# Patient Record
Sex: FEMALE | Race: BLACK OR AFRICAN AMERICAN | NOT HISPANIC OR LATINO | Employment: FULL TIME | URBAN - METROPOLITAN AREA
[De-identification: names, ages, dates, MRNs, and addresses within clinical notes are randomized per-mention and may not be internally consistent; named-entity substitution may affect disease eponyms.]

---

## 2017-01-11 ENCOUNTER — TELEPHONE (OUTPATIENT)
Dept: OBSTETRICS AND GYNECOLOGY | Facility: CLINIC | Age: 52
End: 2017-01-11

## 2017-01-11 ENCOUNTER — OFFICE VISIT (OUTPATIENT)
Dept: OBSTETRICS AND GYNECOLOGY | Facility: CLINIC | Age: 52
End: 2017-01-11
Attending: OBSTETRICS & GYNECOLOGY
Payer: COMMERCIAL

## 2017-01-11 VITALS
HEIGHT: 63 IN | SYSTOLIC BLOOD PRESSURE: 112 MMHG | WEIGHT: 211.44 LBS | DIASTOLIC BLOOD PRESSURE: 74 MMHG | BODY MASS INDEX: 37.46 KG/M2

## 2017-01-11 DIAGNOSIS — R10.31 RIGHT LOWER QUADRANT PAIN: Primary | ICD-10-CM

## 2017-01-11 PROCEDURE — 99999 PR PBB SHADOW E&M-EST. PATIENT-LVL III: CPT | Mod: PBBFAC,,, | Performed by: OBSTETRICS & GYNECOLOGY

## 2017-01-11 PROCEDURE — 99213 OFFICE O/P EST LOW 20 MIN: CPT | Mod: S$GLB,,, | Performed by: OBSTETRICS & GYNECOLOGY

## 2017-01-11 PROCEDURE — 1159F MED LIST DOCD IN RCRD: CPT | Mod: S$GLB,,, | Performed by: OBSTETRICS & GYNECOLOGY

## 2017-01-11 RX ORDER — METRONIDAZOLE 500 MG/1
TABLET ORAL
COMMUNITY
Start: 2017-01-09 | End: 2017-09-27

## 2017-01-11 NOTE — TELEPHONE ENCOUNTER
----- Message from Lisa Cheng sent at 1/11/2017  8:18 AM CST -----  Contact: self  Pt wanting to be seen today, your next available appt was 02/27/17. I scheduled the pt with Dr Patricio for 1:15 today, pt can be reached at [phone].

## 2017-01-11 NOTE — MR AVS SNAPSHOT
[facility] [address]  [address] [facility] [facility] [address] [facility]  [address]  [facility] [address]  Phone: [phone]  Fax: [phone]                  April Krys   2017 1:15 PM   Office Visit    Description:  Female : 1965   Provider:  Ginger Patricio MD   Department:  [facility] [address]           Reason for Visit     Pelvic Pain                To Do List           Goals (5 Years of Data)     None      [facility] On Call     [facility] On Call Nurse [facility] -  Assistance  Registered nurses in the [facility] On Call Center provide clinical advisement, health education, appointment booking, and other advisory services.  Call for this free service at [phone].             Medications           Message regarding Medications     Verify the changes and/or additions to your medication regime listed below are the same as discussed with your clinician today.  If any of these changes or additions are incorrect, please notify your healthcare provider.             Verify that the below list of medications is an accurate representation of the medications you are currently taking.  If none reported, the list may be blank. If incorrect, please contact your healthcare provider. Carry this list with you in case of emergency.           Current Medications     albuterol (ACCUNEB) 0.63 mg/3 mL Nebu Take 0.63 mg by nebulization every 6 (six) hours as needed.    cetirizine (ZYRTEC) 10 MG tablet Take 10 mg by mouth once daily.    ciprofloxacin (CIPRO) 500 MG tablet Take 500 mg by mouth 2 (two) times daily.    clindamycin (CLEOCIN) 300 MG capsule Take 300 mg by mouth 3 (three) times daily.    fluticasone (FLONASE) 50 mcg/actuation nasal spray     hydrocodone-acetaminophen 7.5-300 mg Tab Take 1 tablet by mouth every 4 to 6 hours as needed.    metronidazole (FLAGYL) 500 MG tablet     mometasone (NASONEX) 50 mcg/actuation nasal spray 2 sprays by Nasal route once daily.    tramadol (ULTRAM) 50 mg tablet Take 50 mg by mouth  "every 6 (six) hours as needed.    amoxicillin (AMOXIL) 875 MG tablet Take 875 mg by mouth 3 (three) times daily.           Clinical Reference Information           Vital Signs - Last Recorded  Most recent update: 1/11/2017  1:35 PM by Gamal Araiza LPN    BP Ht Wt LMP BMI    112/74 5' 3" (1.6 m) 95.9 kg (211 lb 6.7 oz) (LMP Unknown) 37.45 kg/m2      Blood Pressure          Most Recent Value    BP  112/74      Allergies as of 1/11/2017     Codeine      Immunizations Administered on Date of Encounter - 1/11/2017     None      "

## 2017-01-11 NOTE — PROGRESS NOTES
HISTORY OF PRESENT ILLNESS:    April Krys is a 51 y.o. female, , No LMP recorded (lmp unknown). Patient has had a hysterectomy.,  presents for a problem visit, complaining of right lower abdominal and back pain for the last week.  She had diarrhea and low grade temp a few days ago;  She saw GI at [facility] - exam normal.    CT scan is scheduled at [facility].  He started her on antibiotics and pain is slightly better but persistent.    Patient has had TLH and RSO.       Past Medical History   Diagnosis Date    Abdominal pain, right lower quadrant     Asthma     PONV (postoperative nausea and vomiting)        Past Surgical History   Procedure Laterality Date    Oophorectomy       AP---RSO    Cholecystectomy      Hysterectomy       KJB---TLH, FIBROIDS, BLEEDING       MEDICATIONS AND ALLERGIES:      Current Outpatient Prescriptions:     albuterol (ACCUNEB) 0.63 mg/3 mL Nebu, Take 0.63 mg by nebulization every 6 (six) hours as needed., Disp: , Rfl:     cetirizine (ZYRTEC) 10 MG tablet, Take 10 mg by mouth once daily., Disp: , Rfl:     ciprofloxacin (CIPRO) 500 MG tablet, Take 500 mg by mouth 2 (two) times daily., Disp: , Rfl: 0    clindamycin (CLEOCIN) 300 MG capsule, Take 300 mg by mouth 3 (three) times daily., Disp: , Rfl: 0    fluticasone (FLONASE) 50 mcg/actuation nasal spray, , Disp: , Rfl: 5    hydrocodone-acetaminophen 7.5-300 mg Tab, Take 1 tablet by mouth every 4 to 6 hours as needed., Disp: , Rfl: 0    metronidazole (FLAGYL) 500 MG tablet, , Disp: , Rfl:     mometasone (NASONEX) 50 mcg/actuation nasal spray, 2 sprays by Nasal route once daily., Disp: , Rfl:     tramadol (ULTRAM) 50 mg tablet, Take 50 mg by mouth every 6 (six) hours as needed., Disp: , Rfl: 0    amoxicillin (AMOXIL) 875 MG tablet, Take 875 mg by mouth 3 (three) times daily., Disp: , Rfl: 0    Review of patient's allergies indicates:   Allergen Reactions    Codeine Itching       Family History   Problem Relation  "Age of Onset    Breast cancer Mother     Colon cancer Neg Hx     Ovarian cancer Neg Hx        Social History     Social History    Marital status:      Spouse name: N/A    Number of children: N/A    Years of education: N/A     Occupational History    Not on file.     Social History Main Topics    Smoking status: Never Smoker    Smokeless tobacco: Not on file    Alcohol use Yes      Comment: occasionally    Drug use: No    Sexual activity: Yes     Partners: Male     Other Topics Concern    Not on file     Social History Narrative       COMPREHENSIVE GYN HISTORY:  PAP History: Denies abnormal Paps.  Infection History: Denies STDs. Denies PID.  Benign History: Denies uterine fibroids. Denies ovarian cysts. Denies endometriosis. Denies other conditions.  Cancer History: Denies cervical cancer. Denies uterine cancer or hyperplasia. Denies ovarian cancer. Denies vulvar cancer or pre-cancer. Denies vaginal cancer or pre-cancer. Denies breast cancer. Denies colon cancer.    ROS:  GENERAL: No weight changes. No swelling. No fatigue. No fever.  CARDIOVASCULAR: No chest pain. No shortness of breath. No leg cramps.   NEUROLOGICAL: No headaches. No vision changes.  BREASTS: No pain. No lumps. No discharge.  ABDOMEN: see HPI.  REPRODUCTIVE: No abnormal bleeding.   VULVA: No pain. No lesions. No itching.  VAGINA: No relaxation. No itching. No odor. No discharge. No lesions.  URINARY: No incontinence. No nocturia. No frequency. No dysuria.    Visit Vitals    /74    Ht 5' 3" (1.6 m)    Wt 95.9 kg (211 lb 6.7 oz)    LMP  (LMP Unknown)    BMI 37.45 kg/m2       PE:  APPEARANCE: Well nourished, well developed, in no acute distress.  ABDOMEN: Soft. No tenderness or masses. No hepatosplenomegaly. No hernias.  BREASTS, FUNDOSCOPIC, RECTAL DEFERRED  PELVIC: External female genitalia without lesions.  Female hair distribution. Adequate perineal body, Normal urethral meatus. Vagina moist and well rugated without " lesions or discharge.  No significant cystocele or rectocele present. Cervix pink without lesions, discharge or tenderness. Uterus is absent. Adnexa not palpable but no masses or tenderness on exam.  EXTREMITIES: No edema      DIAGNOSIS:  1. Right lower quadrant pain  US Pelvis Complete Non OB       COUNSELING:  Patient with hx RSO;  Discussed unlikely pain due to gyn source.  Follow-up ultrasound.  Patient to follow-up with GI and PCP.

## 2017-01-16 ENCOUNTER — HOSPITAL ENCOUNTER (OUTPATIENT)
Dept: RADIOLOGY | Facility: OTHER | Age: 52
Discharge: HOME | End: 2017-01-16
Attending: OBSTETRICS & GYNECOLOGY
Payer: COMMERCIAL

## 2017-01-16 DIAGNOSIS — R10.31 RIGHT LOWER QUADRANT PAIN: ICD-10-CM

## 2017-01-16 PROCEDURE — 76830 TRANSVAGINAL US NON-OB: CPT | Mod: 26,,, | Performed by: RADIOLOGY

## 2017-01-16 PROCEDURE — 76856 US EXAM PELVIC COMPLETE: CPT | Mod: 26,,, | Performed by: RADIOLOGY

## 2017-01-16 PROCEDURE — 76856 US EXAM PELVIC COMPLETE: CPT | Mod: TC

## 2017-01-17 ENCOUNTER — TELEPHONE (OUTPATIENT)
Dept: OBSTETRICS AND GYNECOLOGY | Facility: CLINIC | Age: 52
End: 2017-01-17

## 2017-01-18 NOTE — TELEPHONE ENCOUNTER
----- Message from Evelyne Mark sent at 1/18/2017  8:13 AM CST -----  Contact: self  Patient is requesting test results. Patient can be reached at [phone].

## 2017-01-19 ENCOUNTER — TELEPHONE (OUTPATIENT)
Dept: OBSTETRICS AND GYNECOLOGY | Facility: CLINIC | Age: 52
End: 2017-01-19

## 2017-01-19 DIAGNOSIS — N83.202 LEFT OVARIAN CYST: Primary | ICD-10-CM

## 2017-01-19 NOTE — TELEPHONE ENCOUNTER
Discussed ultrasound results with patient in detail.  Patient states she recently started meds for IBS  All questions answered.  Patient with pain on right;  2.5 cm cyst on left ovary.  Recommended repeat ultrasound in 6-8 weeks.    Please mail patient a work excuse for Tuesday jan 17 to her house.    Please schedule repeat ultrasound in 6-8 weeks.  Ok to schedule for patient - she requests appointment after 3 pm

## 2017-09-13 ENCOUNTER — TELEPHONE (OUTPATIENT)
Dept: OBSTETRICS AND GYNECOLOGY | Facility: CLINIC | Age: 52
End: 2017-09-13

## 2017-09-13 DIAGNOSIS — Z12.39 BREAST CANCER SCREENING: Primary | ICD-10-CM

## 2017-09-13 NOTE — TELEPHONE ENCOUNTER
----- Message from Julio Morrell MA sent at 9/12/2017  4:48 PM CDT -----  Contact: Self      ----- Message -----  From: Cachorro Stock MA  Sent: 9/12/2017   4:34 PM  To: [facility] Staff    Pt is calling to have her mmg orders put in.  The pt can be reached at [phone].  Thanks FL

## 2017-09-27 ENCOUNTER — OFFICE VISIT (OUTPATIENT)
Dept: OBSTETRICS AND GYNECOLOGY | Facility: CLINIC | Age: 52
End: 2017-09-27
Payer: COMMERCIAL

## 2017-09-27 ENCOUNTER — HOSPITAL ENCOUNTER (OUTPATIENT)
Dept: RADIOLOGY | Facility: OTHER | Age: 52
Discharge: HOME | End: 2017-09-27
Attending: OBSTETRICS & GYNECOLOGY
Payer: COMMERCIAL

## 2017-09-27 ENCOUNTER — TELEPHONE (OUTPATIENT)
Dept: OBSTETRICS AND GYNECOLOGY | Facility: CLINIC | Age: 52
End: 2017-09-27

## 2017-09-27 VITALS
BODY MASS INDEX: 38.56 KG/M2 | WEIGHT: 217.63 LBS | SYSTOLIC BLOOD PRESSURE: 116 MMHG | DIASTOLIC BLOOD PRESSURE: 86 MMHG | HEIGHT: 63 IN

## 2017-09-27 DIAGNOSIS — Z12.31 VISIT FOR SCREENING MAMMOGRAM: ICD-10-CM

## 2017-09-27 DIAGNOSIS — Z01.419 ENCOUNTER FOR WELL WOMAN EXAM WITH ROUTINE GYNECOLOGICAL EXAM: Primary | ICD-10-CM

## 2017-09-27 DIAGNOSIS — Z12.39 BREAST CANCER SCREENING: ICD-10-CM

## 2017-09-27 PROCEDURE — 77067 SCR MAMMO BI INCL CAD: CPT | Mod: TC

## 2017-09-27 PROCEDURE — 77067 SCR MAMMO BI INCL CAD: CPT | Mod: 26,,, | Performed by: INTERNAL MEDICINE

## 2017-09-27 PROCEDURE — 99396 PREV VISIT EST AGE 40-64: CPT | Mod: S$GLB,,, | Performed by: ADVANCED PRACTICE MIDWIFE

## 2017-09-27 PROCEDURE — 99999 PR PBB SHADOW E&M-EST. PATIENT-LVL II: CPT | Mod: PBBFAC,,,

## 2017-09-27 PROCEDURE — 3008F BODY MASS INDEX DOCD: CPT | Mod: S$GLB,,, | Performed by: ADVANCED PRACTICE MIDWIFE

## 2017-09-27 NOTE — TELEPHONE ENCOUNTER
Called patient. Patient was inform that  do not have clinic in the afternoon. I asked patient would she like to come at an earlier time patient stated that she took off for work for this appointment. I apologize to the patient and told patient that we have a Women walk in clinic that can do her annual.Patient agreed. Patient also have mammo in the same building.

## 2017-09-27 NOTE — LETTER
September 27, 2017    Karissa Marcano  [address] [address]         [facility] [address]  [address], [facility] [address]  [place] [address]  Phone: [phone]  Fax: [phone] September 27, 2017     Patient: Karissa Marcano   YOB: 1965   Date of Visit: 9/27/2017       To Whom It May Concern:    It is my medical opinion that Karissa Marcano may return to work on 9-28-17.    If you have any questions or concerns, please don't hesitate to call.    Sincerely,        Charley Tony CNM

## 2017-09-27 NOTE — PROGRESS NOTES
HISTORY OF PRESENT ILLNESS:    April Krys is a 52 y.o. female, , No LMP recorded (lmp unknown). Patient has had a hysterectomy.,  presents for a routine annual exam . Pt has no complaints or concerns.    Past Medical History:   Diagnosis Date    Abdominal pain, right lower quadrant     Asthma     PONV (postoperative nausea and vomiting)        Past Surgical History:   Procedure Laterality Date    CHOLECYSTECTOMY      HYSTERECTOMY      KJB---TLH, FIBROIDS, BLEEDING    OOPHORECTOMY      AP---RSO       MEDICATIONS AND ALLERGIES:    No current outpatient prescriptions on file.    Review of patient's allergies indicates:   Allergen Reactions    Codeine Itching       Family History   Problem Relation Age of Onset    Breast cancer Mother     Colon cancer Neg Hx     Ovarian cancer Neg Hx        Social History     Social History    Marital status:      Spouse name: N/A    Number of children: N/A    Years of education: N/A     Occupational History    Not on file.     Social History Main Topics    Smoking status: Never Smoker    Smokeless tobacco: Never Used    Alcohol use No    Drug use: No    Sexual activity: Yes     Partners: Male     Birth control/ protection: None     Other Topics Concern    Not on file     Social History Narrative    No narrative on file       COMPREHENSIVE GYN HISTORY:  PAP History: Denies abnormal Paps.  Infection History: Denies STDs. Denies PID.  Benign History: Denies uterine fibroids. Denies ovarian cysts. Denies endometriosis. Denies other conditions.  Cancer History: Denies cervical cancer. Denies uterine cancer or hyperplasia. Denies ovarian cancer. Denies vulvar cancer or pre-cancer. Denies vaginal cancer or pre-cancer. Denies breast cancer. Denies colon cancer.  Sexual Activity History:  currently  sexually active  Menstrual History:   Dysmenorrhea History:  Contraception:    ROS:  GENERAL: No weight changes. No swelling. No fatigue. No  "fever.  CARDIOVASCULAR: No chest pain. No shortness of breath. No leg cramps.   NEUROLOGICAL: No headaches. No vision changes.  BREASTS: No pain. No lumps. No discharge.  ABDOMEN: No pain. No nausea. No vomiting. No diarrhea. No constipation.  REPRODUCTIVE: No abnormal bleeding.   VULVA: No pain. No lesions. No itching.  VAGINA: No relaxation. No itching. No odor. No discharge. No lesions.  URINARY: No incontinence. No nocturia. No frequency. No dysuria.    /86 (BP Method: Large (Manual))   Ht 5' 3" (1.6 m)   Wt 98.7 kg (217 lb 9.5 oz)   LMP  (LMP Unknown)   BMI 38.55 kg/m²     PE:  APPEARANCE: Well nourished, well developed, in no acute distress.  AFFECT: WNL, alert and oriented x 3. Interactive during exam  SKIN: No acne or hirsutism.  NECK: Neck symmetric, without masses or thyromegaly.  NODES: No inguinal, axillary or femoral lymph node enlargement.  CHEST: Good respiratory effort.   ABDOMEN: Soft. No tenderness or masses. No hepatosplenomegaly. No hernias.  BREASTS: Symmetrical, no skin changes, visible lesions, palpable masses or nipple discharge bilaterally.  PELVIC: External female genitalia without lesions.  Female hair distribution. Adequate perineal body, Normal urethral meatus. Vagina moist and well rugated without lesions or discharge.  No significant cystocele or rectocele present. Cervix and uterus surgically absent.and nontender. Adnexa without masses or tenderness.  EXTREMITIES: No edema    PROCEDURES:  None    DIAGNOSIS:  1. Gyn exam    LABS AND TESTS ORDERED: Mammogram scheduled today    MEDICATIONS PRESCRIBED:None    COUNSELING:  The patient was counseled today on:  -A.C.S. Pap and pelvic exam guidelines, recomendations for yearly mammogram, monthly self breast exams and to follow up with her PCP for other health maintenance.    FOLLOW-UP : q 2-3 years for gyn exam  "

## 2019-06-04 ENCOUNTER — TELEPHONE (OUTPATIENT)
Dept: OBSTETRICS AND GYNECOLOGY | Facility: CLINIC | Age: 54
End: 2019-06-04

## 2019-06-04 ENCOUNTER — OFFICE VISIT (OUTPATIENT)
Dept: OBSTETRICS AND GYNECOLOGY | Facility: CLINIC | Age: 54
End: 2019-06-04
Payer: COMMERCIAL

## 2019-06-04 VITALS — BODY MASS INDEX: 35.96 KG/M2 | WEIGHT: 203 LBS | DIASTOLIC BLOOD PRESSURE: 66 MMHG | SYSTOLIC BLOOD PRESSURE: 123 MMHG

## 2019-06-04 DIAGNOSIS — N76.3 CHRONIC VULVITIS: ICD-10-CM

## 2019-06-04 DIAGNOSIS — Z12.39 BREAST CANCER SCREENING: ICD-10-CM

## 2019-06-04 DIAGNOSIS — Z90.710 POST HYSTERECTOMY MENOPAUSE: ICD-10-CM

## 2019-06-04 DIAGNOSIS — E89.40 POST HYSTERECTOMY MENOPAUSE: ICD-10-CM

## 2019-06-04 DIAGNOSIS — Z01.419 WELL WOMAN EXAM WITH ROUTINE GYNECOLOGICAL EXAM: Primary | ICD-10-CM

## 2019-06-04 DIAGNOSIS — N89.8 VAGINAL DISCHARGE: ICD-10-CM

## 2019-06-04 PROCEDURE — 87510 GARDNER VAG DNA DIR PROBE: CPT

## 2019-06-04 PROCEDURE — 99999 PR PBB SHADOW E&M-EST. PATIENT-LVL III: ICD-10-PCS | Mod: PBBFAC,,, | Performed by: NURSE PRACTITIONER

## 2019-06-04 PROCEDURE — 87480 CANDIDA DNA DIR PROBE: CPT

## 2019-06-04 PROCEDURE — 99396 PREV VISIT EST AGE 40-64: CPT | Mod: S$GLB,,, | Performed by: NURSE PRACTITIONER

## 2019-06-04 PROCEDURE — 99396 PR PREVENTIVE VISIT,EST,40-64: ICD-10-PCS | Mod: S$GLB,,, | Performed by: NURSE PRACTITIONER

## 2019-06-04 PROCEDURE — 99999 PR PBB SHADOW E&M-EST. PATIENT-LVL III: CPT | Mod: PBBFAC,,, | Performed by: NURSE PRACTITIONER

## 2019-06-04 RX ORDER — TERCONAZOLE 8 MG/G
1 CREAM VAGINAL NIGHTLY
Qty: 20 G | Refills: 4 | Status: SHIPPED | OUTPATIENT
Start: 2019-06-04 | End: 2019-06-07

## 2019-06-04 RX ORDER — FLUCONAZOLE 150 MG/1
TABLET ORAL
Qty: 2 TABLET | Refills: 4 | Status: SHIPPED | OUTPATIENT
Start: 2019-06-04 | End: 2023-09-19 | Stop reason: ALTCHOICE

## 2019-06-04 NOTE — LETTER
June 4, 2019      [facility]  [address]  [place] [address]  Phone: [phone]       Patient: April April Krys   YOB: 1965  Date of Visit: 06/04/2019    To Whom It May Concern:    Jethro Marcano  was at [facility] on 06/04/2019. She may return on 6/5/19 with no  restrictions. If you have any questions or concerns, or if I can be of further assistance, please do not hesitate to contact me.    Sincerely,    Damon Romo LPN

## 2019-06-04 NOTE — PROGRESS NOTES
HISTORY OF PRESENT ILLNESS:    April Krys is a 54 y.o. female,   presents today for her routine visit and c/o persistent vulvar itching.  -Has used OTC Monistat suppositories and Vagifem without relief.   -Then tried a douche, which made the itching worse.   -Denies associated fever, pelvic or vaginal pain, odor, discharge, UTI sx, bleeding.   -She douches and takes hot baths, otherwise does not use other vulvovaginal irritants.     Past Medical History:   Diagnosis Date    Abdominal pain, right lower quadrant     Asthma     PONV (postoperative nausea and vomiting)        Past Surgical History:   Procedure Laterality Date    CHOLECYSTECTOMY      COLONOSCOPY N/A 2013    Performed by [facility] at [facility] (4TH FLR)    EGD (ESOPHAGOGASTRODUODENOSCOPY) N/A 2013    Performed by [facility] at [facility] (4TH FLR)    HYSTERECTOMY      KJB---TLH, FIBROIDS, BLEEDING    OOPHORECTOMY      AP---RSO       MEDICATIONS AND ALLERGIES:      Current Outpatient Medications:     fluconazole (DIFLUCAN) 150 MG Tab, Take one tablet by mouth once and may retreat in 3 days if still symptomatic, Disp: 2 tablet, Rfl: 4    terconazole (TERAZOL 3) 0.8 % vaginal cream, Place 1 applicator vaginally every evening. for 3 days, Disp: 20 g, Rfl: 4    Review of patient's allergies indicates:   Allergen Reactions    Codeine Itching       Family History   Problem Relation Age of Onset    Breast cancer Mother     Breast cancer Sister 40    Colon cancer Neg Hx     Ovarian cancer Neg Hx        Social History     Socioeconomic History    Marital status:      Spouse name: Not on file    Number of children: Not on file    Years of education: Not on file    Highest education level: Not on file   Occupational History    Not on file   Social Needs    Financial resource strain: Not on file    Food insecurity:     Worry: Not on file     Inability: Not on file    Transportation needs:      Medical: Not on file     Non-medical: Not on file   Tobacco Use    Smoking status: Never Smoker    Smokeless tobacco: Never Used   Substance and Sexual Activity    Alcohol use: No    Drug use: No    Sexual activity: Yes     Partners: Male     Birth control/protection: None   Lifestyle    Physical activity:     Days per week: Not on file     Minutes per session: Not on file    Stress: Not on file   Relationships    Social connections:     Talks on phone: Not on file     Gets together: Not on file     Attends Hoahaoism service: Not on file     Active member of club or organization: Not on file     Attends meetings of clubs or organizations: Not on file     Relationship status: Not on file   Other Topics Concern    Not on file   Social History Narrative    Not on file       OB HISTORY: Number of vaginal deliveries:1    COMPREHENSIVE GYN HISTORY:  PAP History: Denies abnormal Paps.  Infection History: Denies STDs. Denies PID.  Benign History: Reports uterine fibroids. Denies ovarian cysts. Denies endometriosis. Denies other conditions.  Cancer History: Denies cervical cancer. Denies uterine cancer or hyperplasia. Denies ovarian cancer. Denies vulvar cancer or pre-cancer. Denies vaginal cancer or pre-cancer. Denies breast cancer. Denies colon cancer.  Sexual Activity History: Reports currently being sexually active  Menstrual History: Denies menses. Pt is  not on ERT.     ROS:  GENERAL: + TRYING TO LOSE WEIGHT. No swelling. No fatigue. No fever.  CARDIOVASCULAR: No chest pain. No shortness of breath. No leg cramps.   NEUROLOGICAL: No headaches. No vision changes.  BREASTS: No pain. No lumps. No discharge.  ABDOMEN: No pain. No nausea. No vomiting. No diarrhea. No constipation.  REPRODUCTIVE: No abnormal bleeding.  VULVA: No pain. No lesions. + ITCHING.  VAGINA: No relaxation. No itching. No odor. No discharge. No lesions.  URINARY: No incontinence. No nocturia. No frequency. No dysuria.    /[birth year]   Wt  92.1 kg (203 lb)   LMP  (LMP Unknown)   BMI 35.96 kg/m²   9-27-17 Wt 98.7 kg (217 lb 9.5 oz)    PE:  APPEARANCE: Well nourished, well developed, in no acute distress.  AFFECT: WNL, alert and oriented x 3.  SKIN: No acne or hirsutism.  NECK: Neck symmetric without masses or thyromegaly.  NODES: No inguinal, cervical, axillary or femoral lymph node enlargement.  CHEST: Good respiratory effort.   ABDOMEN: Soft. No tenderness or masses. OBESE.  BREASTS: Symmetrical, no skin changes or visible lesions. No palpable masses, nipple discharge bilaterally.  PELVIC: ATROPHIC EXTERNAL FEMALE GENITALIA with ERYTHEMA and SATELLITE LESIONS BILATERAL GROIN. Normal hair distribution. Adequate perineal body, normal urethral meatus. VAGINA ATROPHIC with WHITE THIN D/C without lesions. No significant cystocele or rectocele. Bimanual exam shows CERVIX and UTERUS to be SURGICALLY ABSENT. Adnexa without masses or tenderness. EXAM DIFFICULT DUE TO BODY HABITUS.  EXTREMITIES: No edema.    DIAGNOSIS:  1. Well woman exam with routine gynecological exam    2. Post hysterectomy menopause    3. Chronic vulvitis    4. Vaginal discharge    5. Breast cancer screening        Orders Placed This Encounter    Vaginosis Screen by DNA Probe    Mammo Digital Screening Bilat w/ Elan    fluconazole (DIFLUCAN) 150 MG Tab    terconazole (TERAZOL 3) 0.8 % vaginal cream       COUNSELING:  The patient was counseled today on:  -Vaginitis prevention including :  a. avoiding feminine products such as deoderant soaps, body wash, bubble bath, douches, scented toilet paper, deoderant tampons or pads, baby or feminine wipes, chronic pad use, etc. and       b. avoiding other vulvovaginal irritants such as long hot baths, humidity, tight, synthetic clothing, chlorine and sitting around in wet bathing suits and   c. wearing cotton underwear, avoiding thong underwear and no underwear to bed and      d. taking showers instead of baths and use a hair dryer on cool  setting afterwards to dry and  e.wearing cotton to exercise and shower immediately after exercise and change clothes and  f. using polyurethane condoms without spermicide if sexually active and symptoms are triggered by intercourse.  -Diflucan and Terazol cream use and potential side effects;  -pelvic rest until symptoms resolve;  -osteoporosis prevention and regular weight bearing exercise;  -ACS PAP guidelines (no paps), with recommendations for pelvic exams every 2 years as her uterus and cervix were removed for benign reasons and one ovary remains;  -recommendation for yearly mammogram;  -to see her primary care physician for all other health maintenance.    FOLLOW-UP with me pending test results and with Dr Gaviria for next routine visit.      06-Jun-2017

## 2019-06-05 LAB
BACTERIAL VAGINOSIS DNA: NEGATIVE
CANDIDA GLABRATA DNA: NEGATIVE
CANDIDA KRUSEI DNA: NEGATIVE
CANDIDA RRNA VAG QL PROBE: NEGATIVE
T VAGINALIS RRNA GENITAL QL PROBE: NEGATIVE

## 2019-10-07 ENCOUNTER — HOSPITAL ENCOUNTER (OUTPATIENT)
Dept: RADIOLOGY | Facility: HOSPITAL | Age: 54
Discharge: HOME | End: 2019-10-07
Attending: NURSE PRACTITIONER
Payer: COMMERCIAL

## 2019-10-07 ENCOUNTER — OFFICE VISIT (OUTPATIENT)
Dept: ORTHOPEDICS | Facility: CLINIC | Age: 54
End: 2019-10-07
Payer: COMMERCIAL

## 2019-10-07 VITALS
WEIGHT: 212.31 LBS | BODY MASS INDEX: 37.62 KG/M2 | HEART RATE: 101 BPM | HEIGHT: 63 IN | DIASTOLIC BLOOD PRESSURE: 83 MMHG | SYSTOLIC BLOOD PRESSURE: 135 MMHG

## 2019-10-07 DIAGNOSIS — M72.2 PLANTAR FASCIITIS: Primary | ICD-10-CM

## 2019-10-07 DIAGNOSIS — M79.671 RIGHT FOOT PAIN: Primary | ICD-10-CM

## 2019-10-07 DIAGNOSIS — M79.671 RIGHT FOOT PAIN: ICD-10-CM

## 2019-10-07 PROCEDURE — 99203 PR OFFICE/OUTPT VISIT, NEW, LEVL III, 30-44 MIN: ICD-10-PCS | Mod: S$GLB,,, | Performed by: NURSE PRACTITIONER

## 2019-10-07 PROCEDURE — 3008F BODY MASS INDEX DOCD: CPT | Mod: CPTII,S$GLB,, | Performed by: NURSE PRACTITIONER

## 2019-10-07 PROCEDURE — 73630 XR FOOT COMPLETE 3 VIEW RIGHT: ICD-10-PCS | Mod: 26,RT,, | Performed by: RADIOLOGY

## 2019-10-07 PROCEDURE — 73630 X-RAY EXAM OF FOOT: CPT | Mod: TC,RT

## 2019-10-07 PROCEDURE — 3008F PR BODY MASS INDEX (BMI) DOCUMENTED: ICD-10-PCS | Mod: CPTII,S$GLB,, | Performed by: NURSE PRACTITIONER

## 2019-10-07 PROCEDURE — 99999 PR PBB SHADOW E&M-EST. PATIENT-LVL III: CPT | Mod: PBBFAC,,, | Performed by: NURSE PRACTITIONER

## 2019-10-07 PROCEDURE — 73630 X-RAY EXAM OF FOOT: CPT | Mod: 26,RT,, | Performed by: RADIOLOGY

## 2019-10-07 PROCEDURE — 99203 OFFICE O/P NEW LOW 30 MIN: CPT | Mod: S$GLB,,, | Performed by: NURSE PRACTITIONER

## 2019-10-07 PROCEDURE — 99999 PR PBB SHADOW E&M-EST. PATIENT-LVL III: ICD-10-PCS | Mod: PBBFAC,,, | Performed by: NURSE PRACTITIONER

## 2019-10-07 RX ORDER — MELOXICAM 7.5 MG/1
7.5 TABLET ORAL DAILY
Qty: 20 TABLET | Refills: 0 | Status: SHIPPED | OUTPATIENT
Start: 2019-10-07 | End: 2023-09-19

## 2019-10-07 NOTE — PROGRESS NOTES
SUBJECTIVE:     Chief Complaint & History of Present Illness:  April Krys is a New 54 y.o. year old female patient here for constant right foot pain which has been present for 1 month.  There is not a history of injury.  The pain is located in the plantar and heel aspect of the foot.  The pain is described as achy, 4/10.  It is aggravated by getting up out of the bed in the morning.  There is not radiation, numbness or tingling into the toes.  Associated symptoms include worsens after rest. Previous treatments include heat pad and ice and massage which have provided adequate relief.  There is not a history of previous injury or surgery to the foot.   The patient does not use an assistive device.    Review of patient's allergies indicates:   Allergen Reactions    Codeine Itching         Current Outpatient Medications   Medication Sig Dispense Refill    fluconazole (DIFLUCAN) 150 MG Tab Take one tablet by mouth once and may retreat in 3 days if still symptomatic (Patient not taking: Reported on 10/7/2019) 2 tablet 4    meloxicam (MOBIC) 7.5 MG tablet Take 1 tablet (7.5 mg total) by mouth once daily. 20 tablet 0     No current facility-administered medications for this visit.        Past Medical History:   Diagnosis Date    Abdominal pain, right lower quadrant     Asthma     PONV (postoperative nausea and vomiting)        Past Surgical History:   Procedure Laterality Date    CHOLECYSTECTOMY      HYSTERECTOMY  2009    [facility]---[facility], FIBROIDS, BLEEDING    OOPHORECTOMY  2006    [facility]---RSO       Family History   Problem Relation Age of Onset    Breast cancer Mother     Breast cancer Sister 40    Colon cancer Neg Hx     Ovarian cancer Neg Hx          Review of Systems:  ROS:  Constitutional: no fever or chills  Eyes: no visual changes  ENT: no nasal congestion or sore throat  Respiratory: no cough or shortness of breath  Cardiovascular: no chest pain or palpitations  Gastrointestinal: no nausea or vomiting,  "tolerating diet  Genitourinary: no hematuria or dysuria  Integument/Breast: no rash or pruritis  Hematologic/Lymphatic: no easy bruising or lymphadenopathy  Musculoskeletal: right foot pain  Neurological: no seizures or tremors  Behavioral/Psych: no auditory or visual hallucinations  Endocrine: no heat or cold intolerance      OBJECTIVE:     PHYSICAL EXAM:  Vital Signs (Most Recent)  Vitals:    10/07/19 1308   BP: 135/83   Pulse: 101     Height: 5' 3" (160 cm) Weight: 96.3 kg (212 lb 4.9 oz),   General Appearance: Well nourished, well developed, in no acute distress.  HENT: Normal cephalic, oropharynx pink and moist  Eyes: PERRLA bilaterally and EOM x 4  Respiratory: Even and unlabored  Skin: Warm and Dry.   Psychiatric: AAO x 4, Mood & affect are normal.    right  Foot/Ankle    General appearance: no acute distress, alert/oriented x3, appropriate mood and affect, looks stated age and well nourished  The examination was performed out of splint/cast  Skin: normal  Swelling: none  Warmth: no warmth  Tenderness: diffuse  ROM: 20 degrees dorsiflexion, 30 degrees plantarflexion, 10 degrees inversion and 10 degrees eversion  Strength: normal  Gait: normal  Stability: stable to testing and Cotton test: negative  Crepitus: no  Neurological Exam: normal  Vascular Exam: normal and pulse present    normal exam, no swelling, tenderness, instability; ligaments intact, full range of motion of all ankle/foot joints      RADIOGRAPHS:  X-ray of right foot obtained and personally reviewed by me, no fracture or dislocation seen.  She has a calcaneous spur.    ASSESSMENT/PLAN:       ICD-10-CM ICD-9-CM   1. Plantar fasciitis M72.2 728.71       Plan:  -Patient with right foot pain consistent with Plantar Fascitis.  -X-ray as above.  -Will recommend she wear a plantar fasca boot at night.  -Recommend stretching foot prior to getting out of bed in am and use of ice packs PRN.  -Will trial Mobic, take daily with food for 10 days.  -Patient " will follow up in 6 weeks or sooner for new or worsening pain, if not better will refer to therapy.  If no improvement with therapy, refer to foot/ankle surgeon.

## 2019-11-25 ENCOUNTER — OFFICE VISIT (OUTPATIENT)
Dept: ORTHOPEDICS | Facility: CLINIC | Age: 54
End: 2019-11-25
Payer: COMMERCIAL

## 2019-11-25 VITALS
DIASTOLIC BLOOD PRESSURE: 87 MMHG | WEIGHT: 207 LBS | HEART RATE: 81 BPM | SYSTOLIC BLOOD PRESSURE: 122 MMHG | HEIGHT: 63 IN | BODY MASS INDEX: 36.68 KG/M2

## 2019-11-25 DIAGNOSIS — M79.671 CHRONIC HEEL PAIN, RIGHT: ICD-10-CM

## 2019-11-25 DIAGNOSIS — M72.2 PLANTAR FASCIITIS, RIGHT: Primary | ICD-10-CM

## 2019-11-25 DIAGNOSIS — G89.29 CHRONIC HEEL PAIN, RIGHT: ICD-10-CM

## 2019-11-25 PROCEDURE — 99213 OFFICE O/P EST LOW 20 MIN: CPT | Mod: S$GLB,,, | Performed by: ORTHOPAEDIC SURGERY

## 2019-11-25 PROCEDURE — 99999 PR PBB SHADOW E&M-EST. PATIENT-LVL III: ICD-10-PCS | Mod: PBBFAC,,, | Performed by: ORTHOPAEDIC SURGERY

## 2019-11-25 PROCEDURE — 99999 PR PBB SHADOW E&M-EST. PATIENT-LVL III: CPT | Mod: PBBFAC,,, | Performed by: ORTHOPAEDIC SURGERY

## 2019-11-25 PROCEDURE — 99213 PR OFFICE/OUTPT VISIT, EST, LEVL III, 20-29 MIN: ICD-10-PCS | Mod: S$GLB,,, | Performed by: ORTHOPAEDIC SURGERY

## 2019-11-25 RX ORDER — ALBUTEROL SULFATE 90 UG/1
AEROSOL, METERED RESPIRATORY (INHALATION)
Refills: 0 | COMMUNITY
Start: 2019-09-30

## 2019-11-25 RX ORDER — AZELASTINE 1 MG/ML
SPRAY, METERED NASAL
Refills: 0 | COMMUNITY
Start: 2019-09-30

## 2019-11-25 NOTE — PATIENT INSTRUCTIONS
Today, you saw Dr. Albarran and were seen for plantar fasciitis with also some central heel pain.  Central heel pain relates to the cushion on your heel, and there is a risk with injections that the cushion gets thinner.    WHAT IS PLANTAR FASCIITIS?  If your first few steps out of bed in the morning cause severe pain in the heel of your foot, you may have plantar fasciitis, an overuse injury that affects the sole of the foot. A diagnosis of plantar fasciitis means you have inflamed the tough, fibrous band of tissue (fascia) connecting your heel bone to the base of your toes.     https://orthoinfo.aaos.org/globalassets/pdfs/planter-fasciitis.pdf    Causes  You're more likely to develop the condition if you're female, overweight or have a job that requires a lot of walking or standing on hard surfaces. You're also at risk if you walk or run for exercise, especially if you have tight calf muscles that limit how far you can flex your ankles. People with very flat feet or very high arches also are more prone to plantar fasciitis.    Symptoms  Plantar fasciitis typically starts gradually with mild pain at the heel bone often referred to as a stone bruise. You're more likely to feel it after (not during) exercise. The pain classically occurs right after getting up in the morning and after a period of sitting. If you don't treat plantar fasciitis, it may become a chronic condition. You may not be able to keep up your level of activity, and you may develop symptoms of foot, knee, hip and back problems because plantar fasciitis can change the way you walk.    Treatments  Stretching is the best treatment for plantar fasciitis. It may help to try to keep weight off your foot until the initial inflammation goes away. You can also apply ice to the sore area for 20 minutes three or four times a day to relieve your symptoms. Often a doctor will prescribe a nonsteroidal anti-inflammatory medication such as ibuprofen or naproxen. Home  exercises to stretch your Achilles tendon and plantar fascia are the mainstay of treatment and reduce the chance of recurrence.    In one exercise, you lean forward against a wall with one knee straight and heel on the ground. Your other knee is bent. Your heel cord and foot arch stretch as you lean. Hold for 10 seconds, relax and straighten up. Repeat 20 times for each sore heel. It is important to keep the knee fully extended on the side being stretched.     Stretch for plantar fasciitisIn another exercise, you lean forward onto a countertop, spreading your feet apart with one foot in front of the other. Flex your knees and squat down, keeping your heels on the ground as long as possible. Your heel cords and foot arches will stretch as the heels come up in the stretch. Hold for 10 seconds, relax and straighten up. Repeat 20 times.    About 90 percent of people with plantar fasciitis improve significantly after two months of initial treatment. You may be advised to use shoes with shock-absorbing soles or fitted with an off-the-shelf shoe insert device like a rubber heel pad. Your foot may be taped into a specific position.    If your plantar fasciitis continues after a few months of conservative treatment, your doctor may inject your heel with steroidal anti-inflammatory medication.If you still have symptoms, you may need to wear a walking cast for two to three weeks or a positional splint when you sleep. In a few cases, surgery is needed for chronically contracted tissue.     Plantar Fascia-Specific Stretching Program    1.) Cross your affected leg over your other leg.  2.) Using the hand on your affected side, take hold of your affected foot and pull your toes back towards shin. This creates tension/stretch in the arch of the foot/plantar fascia.  3.) Check for the appropriate stretch position by gently rubbing the thumb of your unaffected side left to right over the arch of the affected foot. The plantar fascia  should feel firm, like a guitar string.  4.) Hold the stretch for a count of 10. A set is 10 repetitions.    Perform at least three sets of stretches per day. You cannot perform the stretch too often. The most important times to stretch are before taking the first step in the morning and before standing after a period of prolonged sitting.    Additional Stretch: Achilles Tendon Stretch    1.) Place a shoe insert under your affected foot.  2.) Place your affected leg behind your unaffected leg with the toes of your back foot pointed towards the heel of your other foot.  3.) Lean into the wall.  4.) Bend your front knee while keeping your back leg straight with your heel firmly on the ground.  5.) Hold the stretch for a count of 10. A set is 10 repetitions.  6.) Perform the stretch at least three times a day.    Anti-inflammatory medications can help decrease the inflammation in the arch and heel of your foot. These medications include Advil®, Motrin®, Ibuprofen, and Aleve®. Use the medication as directed on the package. If you tolerate it well, take it daily for two weeks then discontinue for one week. If symptoms worsen or return, resume for two weeks, then stop. You should eat when taking these medications, as they can be hard on your stomach.    Over the counter inserts provide added arch support and soft cushion. Some patients will require custom inserts, and your surgeon may provide a prescription for these, but they can be an expensive out of pocket expense if your insurance does not cover them.    https://www.footcaremd.org/conditions-treatments/heel/plantar-fasciitis  The American Orth[employer]edic Foot & Ankle Society (AOFAS) offers information on this site as an educational service. The content of FootCareMD, including text, images, and graphics, is for informational purposes only. The content is not intended to substitute for professional medical advice, diagnoses or treatments.    Plantar Fasciitis  Exercises  Toe Curls With Towel    1. Place a small towel on the floor. Using involved foot, curl towel toward you, using only your toes. Relax.  2. Repeat 10 times, 1-2 times per day.    Toe Extension    1. Sit with involved leg crossed over uninvolved leg. Grasp toes with one hand and bend the toes and ankle upwards as far as possible to stretch the arch and calf muscle. With the other hand, perform deep massage along the arch of your foot.  2. Hold 10 seconds. Repeat for 2-3 minutes. Repeat 2-4 sessions per day.    Standing Calf Stretch    1. Stand placing hands on wall for support. Place your feet pointing straight ahead, with the involved foot in back of the other. The back leg should have a straight knee and front leg a bent knee. Shift forward, keeping back leg heel on the ground, so that you feel a stretch in the calf muscle of the back leg.  2. Hold 45 seconds, 2-3 times. Repeat 4-6 times per day.    Towel Stretch    The towel stretch is effective at reducing morning pain if done before getting out of bed.  1. Sit with involved leg straight out in front of you. Place a towel around your foot and gently pull toward you, feeling a stretch in your calf muscle.  2. Hold 45 seconds, 2-3 times. Repeat 4-6 times per day.     Calf Stretch on a Step    1. Stand with uninvolved foot flat on a step. Place involved ball of foot on the edge of the step. Gently let heel lower on involved leg to feel a stretch in your calf.  2. Hold 45 seconds, 2-3 times. Repeat 4-6 times per day.    Ice Massage Arch Roll    1. With involved foot resting on a frozen can or water bottle, golf ball, or tennis ball, roll your foot back and forth over the object.  2. Repeat for 3-5 minutes, 2 times per day.      Adapted from https://www.ortho.[facility].edu/content/Education/3691/Patient-Education/Educational-Materials/Plantar-Fasciitis-Exercises.aspx         We decided the next step(s) in in treatment is/are   Continuing the stretching  daily   Continuing icing daily   Try gel heel cups vs the shoe inserts   Topical remedies can help (icy hot, capsaicin, bengay, etc)   MRI and injections are reserved for symptoms that have lasted for more than 6 months.      Thank you for allowing me to participate in your care.  We will see you back after 3 months - you will let me know if its not better and we will order an MRI.

## 2019-11-25 NOTE — LETTER
November 25, 2019        Raymond Cheng NP  [address]  [facility] [address]             [facility]  [address]  [facility] [address]  Phone: [phone]   Patient: Karissa Marcano   MR Number: [id number]   YOB: 1965   Date of Visit: 11/25/2019     Dear Aaareferral Self,    Thank you for your referral of Karissa Marcano for evaluation of their right plantar fasciitis with a component of central heel pain.  Please see attached consultation note for details regarding our visit.    I appreciate the opportunity to participate in the care of our mutual patient.  Please do not hesitate to reach out with questions/concerns.    Sincerely,    Lu Albarran MD  Foot & Ankle Orthopedic Surgery  11/25/2019  [phone]

## 2019-11-26 NOTE — PROGRESS NOTES
Subjective:   Chief complaint:   Chief Complaint   Patient presents with    Right Foot - Pain       HPI:   April Krys is a 54 y.o. female who presents today for evaluation of right heel pain.  Rates pain as 7/10.  Pain has been ongoing for 3 months.  Inciting event: none known- questions may be a possible injury.  Treatments tried:  Night splint, stretching, ice, anti-inflammatories.    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 She localizes pain to the medial plantar fascial origin but also, significantly to the central heel fat pad.  Pain is worse in the morning.  Denies significant swelling. Has tried several shoe wear modifications as well as over-the-counter inserts with only some mild improvement.  On her feet all day at work, and notes talking to several of her coworkers, who have had mixed results with prior plantar fascial injections.. No previous injections.  Never had plantar fasciitis before.    Referred to me for discussion of possible injection.    ROS:  Musculoskeletal: per HPI  Constitutional: Negative for fever  Cardiovascular: Negative for chest pain  Respiratory: Negative for shortness of breath  Skin: Negative for ulcers or lesions  Neurological: Negative for burning, tingling and numbness  Vascular: Negative for peripheral edema  Heme: Negative for chronic anticoagulation; Negative for history of blood clot  Endocrine: Negative for diabetes  Immune: Negative for inflammatory arthritis  /Nephrology: Negative for ESRD-on hemodialysis       Objective:   Exam:  Vitals:    11/25/19 1404   BP: 122/87   Pulse: 81     General: No acute distress, well-appearing  Neurologic: Alert and oriented x3  Psychiatric: Appropriate mood and affect, cooperative  Cardiovascular: Regular pulse  Respiratory: Breathing on room air  Skin: No rashes or ulcers  Vascular:  Bilateral feet with palpable pedal pulses  Musculoskeletal:  Standing  examination demonstrates fairly neutral and symmetric hindfoot alignment with a slight tendency towards pes planus worse on the right than the left.    Right Achilles is more flexible than the left interestingly.  On the right she dorsiflexes past neutral and it does improve out 5° with knee flexion. On the left she only dorsiflexes to neutral.    On the right, plantar fascia origin is nontender.  Stretching of the plantar fascia through the when less mechanism is not irritable.  Plantar fascia is in continuity.  Very mild tenderness over the central heel.  Calcaneal squeeze is negative. No tenderness over the abductors fascia.  Tinel's over the tarsal tunnel is negative. Achilles insertion is nontender.  Ankle range of motion is not irritable.  Fires tibialis anterior, gastrocsoleus, peroneals, posterior tibial tendon with 5/5 strength. Sensation intact light touch throughout superficial peroneal, deep peroneal, tibial nerve distributions.    Imaging:  Prior radiographs were personally reviewed by me.    Standing three views right foot demonstrate no spurring of the plantar fascial origin.  No acute osseous abnormalities seen.    Additional records/labs reviewed:  None      Assessment:     1. Plantar fasciitis, right    2. Chronic heel pain, right (central heel pain)         I reviewed imaging, clinical history, and diagnosis as above with the patient. I attempted to use layman's terms to educate the patient as well as utilize foot models and/or pictures.   I personally went through imaging with the patient.  I emphasized the role for non-operative treatment.  Non-operative treatment discussed with patient include: Anti-inflammatory medications or creams, RICE modalities and stretching.  Surgery would be reserved for refractory symptoms lasting greater than 1 year.    Discussed that plantar fasciitis is a difficult and challenging condition both for patients and physicians alike.  Reviewed the tendency for  prolonged courses prior to symptom resolution.  Discussed that MRI and/or injections are not considered in my treatment algorithm until at least 6 months of refractory symptoms despite appropriate treatment.    I specially discussed that in her case, with a component of central heel pain, I would be especially hesitant to consider an injection this early as there is a risk of fat pad atrophy from injection of corticosteroid.         Plan:       1.  Therapy: Plantar fascial handout and stretches provided  2.  Symptomatic treatment: OTC NSAIDs recommended- monitor stomach irriation  3.  Restrictions: Advance activity as tolerated, use pain as guide  4.  Brace/orthotics/etc: Gel heel cups  4.  Follow-up: 3 months via mychart to discuss ordering MRI and possibly consider injection at that point      No orders of the defined types were placed in this encounter.      Past Medical History:   Diagnosis Date    Abdominal pain, right lower quadrant     Asthma     PONV (postoperative nausea and vomiting)        Past Surgical History:   Procedure Laterality Date    CHOLECYSTECTOMY      HYSTERECTOMY  2009    [facility]---TLH, FIBROIDS, BLEEDING    OOPHORECTOMY  2006    [facility]---RSO       Family History   Problem Relation Age of Onset    Breast cancer Mother     Breast cancer Sister 40    Colon cancer Neg Hx     Ovarian cancer Neg Hx        Social History     Socioeconomic History    Marital status:      Spouse name: Not on file    Number of children: Not on file    Years of education: Not on file    Highest education level: Not on file   Occupational History    Not on file   Social Needs    Financial resource strain: Not on file    Food insecurity:     Worry: Not on file     Inability: Not on file    Transportation needs:     Medical: Not on file     Non-medical: Not on file   Tobacco Use    Smoking status: Never Smoker    Smokeless tobacco: Never Used   Substance and Sexual Activity    Alcohol use: No    Drug  use: No    Sexual activity: Yes     Partners: Male     Birth control/protection: None   Lifestyle    Physical activity:     Days per week: Not on file     Minutes per session: Not on file    Stress: Not on file   Relationships    Social connections:     Talks on phone: Not on file     Gets together: Not on file     Attends Cheondoism service: Not on file     Active member of club or organization: Not on file     Attends meetings of clubs or organizations: Not on file     Relationship status: Not on file   Other Topics Concern    Not on file   Social History Narrative    Not on file

## 2023-01-31 ENCOUNTER — OFFICE VISIT (OUTPATIENT)
Dept: OBSTETRICS AND GYNECOLOGY | Facility: CLINIC | Age: 58
End: 2023-01-31
Payer: COMMERCIAL

## 2023-01-31 VITALS
DIASTOLIC BLOOD PRESSURE: 86 MMHG | SYSTOLIC BLOOD PRESSURE: 122 MMHG | WEIGHT: 173.94 LBS | BODY MASS INDEX: 30.82 KG/M2 | HEIGHT: 63 IN

## 2023-01-31 DIAGNOSIS — Z01.419 WOMEN'S ANNUAL ROUTINE GYNECOLOGICAL EXAMINATION: Primary | ICD-10-CM

## 2023-01-31 DIAGNOSIS — N89.8 VAGINAL ITCHING: ICD-10-CM

## 2023-01-31 PROCEDURE — 99386 PR PREVENTIVE VISIT,NEW,40-64: ICD-10-PCS | Mod: S$GLB,,, | Performed by: ADVANCED PRACTICE MIDWIFE

## 2023-01-31 PROCEDURE — 3079F PR MOST RECENT DIASTOLIC BLOOD PRESSURE 80-89 MM HG: ICD-10-PCS | Mod: CPTII,S$GLB,, | Performed by: ADVANCED PRACTICE MIDWIFE

## 2023-01-31 PROCEDURE — 99386 PREV VISIT NEW AGE 40-64: CPT | Mod: S$GLB,,, | Performed by: ADVANCED PRACTICE MIDWIFE

## 2023-01-31 PROCEDURE — 1159F PR MEDICATION LIST DOCUMENTED IN MEDICAL RECORD: ICD-10-PCS | Mod: CPTII,S$GLB,, | Performed by: ADVANCED PRACTICE MIDWIFE

## 2023-01-31 PROCEDURE — 99999 PR PBB SHADOW E&M-NEW PATIENT-LVL III: CPT | Mod: PBBFAC,,, | Performed by: ADVANCED PRACTICE MIDWIFE

## 2023-01-31 PROCEDURE — 99999 PR PBB SHADOW E&M-NEW PATIENT-LVL III: ICD-10-PCS | Mod: PBBFAC,,, | Performed by: ADVANCED PRACTICE MIDWIFE

## 2023-01-31 PROCEDURE — 3008F BODY MASS INDEX DOCD: CPT | Mod: CPTII,S$GLB,, | Performed by: ADVANCED PRACTICE MIDWIFE

## 2023-01-31 PROCEDURE — 87480 CANDIDA DNA DIR PROBE: CPT | Performed by: ADVANCED PRACTICE MIDWIFE

## 2023-01-31 PROCEDURE — 3074F SYST BP LT 130 MM HG: CPT | Mod: CPTII,S$GLB,, | Performed by: ADVANCED PRACTICE MIDWIFE

## 2023-01-31 PROCEDURE — 3008F PR BODY MASS INDEX (BMI) DOCUMENTED: ICD-10-PCS | Mod: CPTII,S$GLB,, | Performed by: ADVANCED PRACTICE MIDWIFE

## 2023-01-31 PROCEDURE — 87591 N.GONORRHOEAE DNA AMP PROB: CPT | Performed by: ADVANCED PRACTICE MIDWIFE

## 2023-01-31 PROCEDURE — 3074F PR MOST RECENT SYSTOLIC BLOOD PRESSURE < 130 MM HG: ICD-10-PCS | Mod: CPTII,S$GLB,, | Performed by: ADVANCED PRACTICE MIDWIFE

## 2023-01-31 PROCEDURE — 3079F DIAST BP 80-89 MM HG: CPT | Mod: CPTII,S$GLB,, | Performed by: ADVANCED PRACTICE MIDWIFE

## 2023-01-31 PROCEDURE — 1159F MED LIST DOCD IN RCRD: CPT | Mod: CPTII,S$GLB,, | Performed by: ADVANCED PRACTICE MIDWIFE

## 2023-01-31 NOTE — PROGRESS NOTES
Karissa Marcano is a 57 y.o. female  p[facility] to  [facility] with complaint of vaginal discharge . She reports itching, and denies odor.  She states the discharge is white/creamy. She used monistat without relief. She has clitoral pain after one sexual encounter with a male partner. Pt reports that itchy discharge occurred after this sexual encounter.       ROS:  GENERAL: No fever, chills, fatigability or weight loss.  VULVAR: No pain, no lesion. + itching, + clitoral pain  VAGINAL: No relaxation, no abnormal bleeding and no lesions.  ABDOMEN: No abdominal pain. Denies nausea. Denies vomiting. No diarrhea. No constipation  URINARY: No incontinence, no nocturia, no frequency and no dysuria.      Review of patient's allergies indicates:   Allergen Reactions    Codeine Itching       Current Outpatient Medications:     azelastine (ASTELIN) 137 mcg (0.1 %) nasal spray, SPRAY 2 SPRAYS INTO EACH NOSTRIL TWICE A DAY, Disp: , Rfl: 0    VENTOLIN HFA 90 mcg/actuation inhaler, INHALE 1-2 PUFFS EVERY 4-6 HOURS, Disp: , Rfl: 0    fluconazole (DIFLUCAN) 150 MG Tab, Take one tablet by mouth once and may retreat in 3 days if still symptomatic (Patient not taking: Reported on 2019), Disp: 2 tablet, Rfl: 4    meloxicam (MOBIC) 7.5 MG tablet, Take 1 tablet (7.5 mg total) by mouth once daily. (Patient not taking: Reported on 2019), Disp: 20 tablet, Rfl: 0    Past Medical History:   Diagnosis Date    Abdominal pain, right lower quadrant     Asthma     PONV (postoperative nausea and vomiting)      Past Surgical History:   Procedure Laterality Date    CHOLECYSTECTOMY      HYSTERECTOMY      [facility]---TLH, FIBROIDS, BLEEDING    OOPHORECTOMY  2006    [facility]---RSO     Social History     Tobacco Use    Smoking status: Never    Smokeless tobacco: Never   Substance Use Topics    Alcohol use: No    Drug use: No     OB History    Para Term  AB Living   2 2 1         SAB IAB Ectopic Multiple Live Births              "     # Outcome Date GA Lbr Reginald/2nd Weight Sex Delivery Anes PTL Lv   2 Term            1 Para      Vag-Spont          /86   Ht 5' 3" (1.6 m)   Wt 78.9 kg (173 lb 15.1 oz)   LMP  (LMP Unknown)   BMI 30.81 kg/m²     PHYSICAL EXAM:  GENERAL: Calm and appropriate affect, alert, oriented x4  SKIN: Color appropriate for race, warm and dry, clean and intact with no rashes.  RESP: Even, unlabored breathing  ABDOMEN: Soft, nontender, no masses.  :   Normal external female genitalia without lesions. Tissue atrophic, thin. Prominent clitoris Normal hair distribution. Adequate perineal body, normal urethral meatus.  Vagina pale, atrophic,  no lesions, vaginal discharge -  thin, no odor  No significant cystocele or rectocele.  Cervix - surgically absent      ASSESSMENT / PLAN:    ICD-10-CM ICD-9-CM    1. Women's annual routine gynecological examination  Z01.419 V72.31 Mammo Digital Screening Bilat w/ Elan      2. Vaginal itching  N89.8 698.1 Vaginosis Screen by DNA Probe      C. trachomatis/N. gonorrhoeae by AMP DNA        Women's annual routine gynecological examination  -     Mammo Digital Screening Bilat w/ Elan; Future; Expected date: 01/31/2023    Vaginal itching  -     Vaginosis Screen by DNA Probe  -     C. trachomatis/N. gonorrhoeae by AMP DNA          Patient was counseled today on vaginitis prevention including :  a. avoiding feminine products such as deoderant soaps, body wash, bubble bath, douches, scented toilet paper, deoderant tampons or pads, feminine wipes, chronic pad use, etc.  b. avoiding other vulvovaginal irritants such as long hot baths, humidity, tight, synthetic clothing, chlorine and sitting around in wet bathing suits  c. wearing cotton underwear, avoiding thong underwear and no underwear to bed  d. taking showers instead of baths and use a hair dryer on cool setting afterwards to dry  e. wearing cotton to exercise and shower immediately after exercise and change clothes  f. using " polyurethane condoms without spermicide if sexually active and symptoms are triggered by intercourse  g. Discussed use of vagisil, along with repHresh and probiotics    FOLLOW UP:   Pending lab results, PRN lack of improvement.     Dana VARELA    I have seen the patient, reviewed the  PA student's  assessment, plan, and progress note. I have personally interviewed and examined the patient at bedside and: agree with the findings.     Charley Tony CNM  Obstetrics

## 2023-01-31 NOTE — PROGRESS NOTES
I have seen the patient, reviewed the  PA student's  assessment, plan, and progress note. I have personally interviewed and examined the patient at bedside and: agree with the findings.     Charley Tony CNM  Obstetrics

## 2023-02-01 LAB
C TRACH DNA SPEC QL NAA+PROBE: NOT DETECTED
CANDIDA RRNA VAG QL PROBE: NEGATIVE
G VAGINALIS RRNA GENITAL QL PROBE: NEGATIVE
N GONORRHOEA DNA SPEC QL NAA+PROBE: NOT DETECTED
T VAGINALIS RRNA GENITAL QL PROBE: NEGATIVE

## 2023-02-14 ENCOUNTER — TELEPHONE (OUTPATIENT)
Dept: OBSTETRICS AND GYNECOLOGY | Facility: CLINIC | Age: 58
End: 2023-02-14
Payer: COMMERCIAL

## 2023-02-14 DIAGNOSIS — N95.2 VAGINAL ATROPHY: Primary | ICD-10-CM

## 2023-02-14 NOTE — TELEPHONE ENCOUNTER
Spoke to pt per phone- discussed recommendation to try vaginal estrogen cream and follow up in 2 months for evaluation per MD. Pt agrees and desires to see Dr. Gaviria. Rx sent. Will get appt scheduled.

## 2023-09-14 PROCEDURE — 93010 ELECTROCARDIOGRAM REPORT: CPT | Mod: ,,, | Performed by: INTERNAL MEDICINE

## 2023-09-14 PROCEDURE — 93010 EKG 12-LEAD: ICD-10-PCS | Mod: ,,, | Performed by: INTERNAL MEDICINE

## 2023-09-14 PROCEDURE — 93005 ELECTROCARDIOGRAM TRACING: CPT

## 2023-09-14 PROCEDURE — 99285 EMERGENCY DEPT VISIT HI MDM: CPT

## 2023-09-15 ENCOUNTER — TELEPHONE (OUTPATIENT)
Dept: NEUROLOGY | Facility: CLINIC | Age: 58
End: 2023-09-15
Payer: COMMERCIAL

## 2023-09-15 ENCOUNTER — HOSPITAL ENCOUNTER (EMERGENCY)
Facility: HOSPITAL | Age: 58
Discharge: HOME | End: 2023-09-15
Attending: EMERGENCY MEDICINE
Payer: COMMERCIAL

## 2023-09-15 VITALS
HEART RATE: 76 BPM | SYSTOLIC BLOOD PRESSURE: 123 MMHG | DIASTOLIC BLOOD PRESSURE: 80 MMHG | RESPIRATION RATE: 18 BRPM | OXYGEN SATURATION: 99 % | TEMPERATURE: 98 F

## 2023-09-15 DIAGNOSIS — R55 SYNCOPE: ICD-10-CM

## 2023-09-15 DIAGNOSIS — R07.9 CHEST PAIN: ICD-10-CM

## 2023-09-15 LAB
ALBUMIN SERPL BCP-MCNC: 3.8 G/DL (ref 3.5–5.2)
ALP SERPL-CCNC: 98 U/L (ref 55–135)
ALT SERPL W/O P-5'-P-CCNC: 11 U/L (ref 10–44)
ANION GAP SERPL CALC-SCNC: 13 MMOL/L (ref 8–16)
AST SERPL-CCNC: 17 U/L (ref 10–40)
BACTERIA #/AREA URNS AUTO: ABNORMAL /HPF
BASOPHILS # BLD AUTO: 0.02 K/UL (ref 0–0.2)
BASOPHILS NFR BLD: 0.3 % (ref 0–1.9)
BILIRUB SERPL-MCNC: 0.2 MG/DL (ref 0.1–1)
BILIRUB UR QL STRIP: NEGATIVE
BNP SERPL-MCNC: 32 PG/ML (ref 0–99)
BUN SERPL-MCNC: 15 MG/DL (ref 6–20)
CALCIUM SERPL-MCNC: 9.7 MG/DL (ref 8.7–10.5)
CHLORIDE SERPL-SCNC: 108 MMOL/L (ref 95–110)
CLARITY UR REFRACT.AUTO: ABNORMAL
CO2 SERPL-SCNC: 22 MMOL/L (ref 23–29)
COLOR UR AUTO: YELLOW
CREAT SERPL-MCNC: 0.8 MG/DL (ref 0.5–1.4)
D DIMER PPP IA.FEU-MCNC: 0.39 MG/L FEU
DIFFERENTIAL METHOD: ABNORMAL
EOSINOPHIL # BLD AUTO: 0 K/UL (ref 0–0.5)
EOSINOPHIL NFR BLD: 0.4 % (ref 0–8)
ERYTHROCYTE [DISTWIDTH] IN BLOOD BY AUTOMATED COUNT: 14 % (ref 11.5–14.5)
EST. GFR  (NO RACE VARIABLE): >60 ML/MIN/1.73 M^2
GLUCOSE SERPL-MCNC: 62 MG/DL (ref 70–110)
GLUCOSE UR QL STRIP: NEGATIVE
HCT VFR BLD AUTO: 43.1 % (ref 37–48.5)
HGB BLD-MCNC: 13.4 G/DL (ref 12–16)
HGB UR QL STRIP: NEGATIVE
IMM GRANULOCYTES # BLD AUTO: 0.02 K/UL (ref 0–0.04)
IMM GRANULOCYTES NFR BLD AUTO: 0.3 % (ref 0–0.5)
KETONES UR QL STRIP: ABNORMAL
LEUKOCYTE ESTERASE UR QL STRIP: ABNORMAL
LYMPHOCYTES # BLD AUTO: 1.9 K/UL (ref 1–4.8)
LYMPHOCYTES NFR BLD: 23.6 % (ref 18–48)
MAGNESIUM SERPL-MCNC: 1.9 MG/DL (ref 1.6–2.6)
MCH RBC QN AUTO: 26.8 PG (ref 27–31)
MCHC RBC AUTO-ENTMCNC: 31.1 G/DL (ref 32–36)
MCV RBC AUTO: 86 FL (ref 82–98)
MICROSCOPIC COMMENT: ABNORMAL
MONOCYTES # BLD AUTO: 0.6 K/UL (ref 0.3–1)
MONOCYTES NFR BLD: 6.9 % (ref 4–15)
NEUTROPHILS # BLD AUTO: 5.5 K/UL (ref 1.8–7.7)
NEUTROPHILS NFR BLD: 68.5 % (ref 38–73)
NITRITE UR QL STRIP: NEGATIVE
NRBC BLD-RTO: 0 /100 WBC
PH UR STRIP: 5 [PH] (ref 5–8)
PHOSPHATE SERPL-MCNC: 3.2 MG/DL (ref 2.7–4.5)
PLATELET # BLD AUTO: 278 K/UL (ref 150–450)
PMV BLD AUTO: 9.9 FL (ref 9.2–12.9)
POTASSIUM SERPL-SCNC: 3.6 MMOL/L (ref 3.5–5.1)
PROT SERPL-MCNC: 7.6 G/DL (ref 6–8.4)
PROT UR QL STRIP: NEGATIVE
RBC # BLD AUTO: 5 M/UL (ref 4–5.4)
RBC #/AREA URNS AUTO: 3 /HPF (ref 0–4)
SODIUM SERPL-SCNC: 143 MMOL/L (ref 136–145)
SP GR UR STRIP: 1.03 (ref 1–1.03)
SQUAMOUS #/AREA URNS AUTO: 6 /HPF
T4 FREE SERPL-MCNC: 0.88 NG/DL (ref 0.71–1.51)
TROPONIN I SERPL DL<=0.01 NG/ML-MCNC: <0.006 NG/ML (ref 0–0.03)
TROPONIN I SERPL DL<=0.01 NG/ML-MCNC: <0.006 NG/ML (ref 0–0.03)
TSH SERPL DL<=0.005 MIU/L-ACNC: 0.15 UIU/ML (ref 0.4–4)
URN SPEC COLLECT METH UR: ABNORMAL
WBC # BLD AUTO: 7.98 K/UL (ref 3.9–12.7)
WBC #/AREA URNS AUTO: 7 /HPF (ref 0–5)

## 2023-09-15 PROCEDURE — 84443 ASSAY THYROID STIM HORMONE: CPT | Performed by: EMERGENCY MEDICINE

## 2023-09-15 PROCEDURE — 85379 FIBRIN DEGRADATION QUANT: CPT | Performed by: EMERGENCY MEDICINE

## 2023-09-15 PROCEDURE — 80053 COMPREHEN METABOLIC PANEL: CPT | Performed by: EMERGENCY MEDICINE

## 2023-09-15 PROCEDURE — 84100 ASSAY OF PHOSPHORUS: CPT | Performed by: EMERGENCY MEDICINE

## 2023-09-15 PROCEDURE — 84439 ASSAY OF FREE THYROXINE: CPT | Performed by: EMERGENCY MEDICINE

## 2023-09-15 PROCEDURE — 83880 ASSAY OF NATRIURETIC PEPTIDE: CPT | Performed by: EMERGENCY MEDICINE

## 2023-09-15 PROCEDURE — 83735 ASSAY OF MAGNESIUM: CPT | Performed by: EMERGENCY MEDICINE

## 2023-09-15 PROCEDURE — 81001 URINALYSIS AUTO W/SCOPE: CPT | Performed by: EMERGENCY MEDICINE

## 2023-09-15 PROCEDURE — 84484 ASSAY OF TROPONIN QUANT: CPT | Mod: 91 | Performed by: EMERGENCY MEDICINE

## 2023-09-15 PROCEDURE — 85025 COMPLETE CBC W/AUTO DIFF WBC: CPT | Performed by: EMERGENCY MEDICINE

## 2023-09-15 PROCEDURE — 25500020 PHARM REV CODE 255: Performed by: EMERGENCY MEDICINE

## 2023-09-15 RX ADMIN — IOHEXOL 75 ML: 350 INJECTION, SOLUTION INTRAVENOUS at 02:09

## 2023-09-15 NOTE — ED PROVIDER NOTES
Encounter Date: 9/14/2023       History     Chief Complaint   Patient presents with    Chest Pain     C/o non reproducible, generalized CP x20 minutes and lethargy. Denies sob     HPI    This is a 58-year-old female who presents the ER for evaluation of syncope.  Patient reports that she was in her normal state health tonight, she was seated, she had palpitations felt flushed and then syncopized.  Family reports that patient had a prolonged period of altered mental status, was talking intermittently, no convulsions noted.  Reports this has happened before but never this long.  Came the ER for further evaluation.    Review of patient's allergies indicates:   Allergen Reactions    Codeine Itching     Past Medical History:   Diagnosis Date    Abdominal pain, right lower quadrant     Asthma     PONV (postoperative nausea and vomiting)      Past Surgical History:   Procedure Laterality Date    CHOLECYSTECTOMY      HYSTERECTOMY  2009    KJB---TLH, FIBROIDS, BLEEDING    OOPHORECTOMY  2006    AP---RSO     Family History   Problem Relation Age of Onset    Breast cancer Mother     Breast cancer Sister 40    Colon cancer Neg Hx     Ovarian cancer Neg Hx      Social History     Tobacco Use    Smoking status: Never    Smokeless tobacco: Never   Substance Use Topics    Alcohol use: No    Drug use: No     Review of Systems   Constitutional:  Positive for fatigue.   Cardiovascular:  Positive for chest pain.   All other systems reviewed and are negative.      Physical Exam     Initial Vitals [09/14/23 2222]   BP Pulse Resp Temp SpO2   136/88 90 16 97.8 °F (36.6 °C) 100 %      MAP       --         Physical Exam    Nursing note and vitals reviewed.  Constitutional: She appears well-developed and well-nourished.   HENT:   Head: Normocephalic and atraumatic.   Eyes: Pupils are equal, round, and reactive to light.   Neck:   Normal range of motion.  Cardiovascular:  Normal rate, regular rhythm and normal heart sounds.            Pulmonary/Chest: Breath sounds normal. No respiratory distress.   Abdominal: Abdomen is soft. She exhibits no distension. There is no abdominal tenderness.   Musculoskeletal:         General: Normal range of motion.      Cervical back: Normal range of motion.     Neurological: She is alert and oriented to person, place, and time. She has normal strength. GCS score is 15. GCS eye subscore is 4. GCS verbal subscore is 5. GCS motor subscore is 6.   Skin: Skin is warm and dry. Capillary refill takes less than 2 seconds.   Psychiatric: She has a normal mood and affect. Thought content normal.         ED Course   Procedures  Labs Reviewed   CBC W/ AUTO DIFFERENTIAL - Abnormal; Notable for the following components:       Result Value    MCH 26.8 (*)     MCHC 31.1 (*)     All other components within normal limits   COMPREHENSIVE METABOLIC PANEL - Abnormal; Notable for the following components:    CO2 22 (*)     Glucose 62 (*)     All other components within normal limits   TSH - Abnormal; Notable for the following components:    TSH 0.146 (*)     All other components within normal limits   URINALYSIS, REFLEX TO URINE CULTURE - Abnormal; Notable for the following components:    Appearance, UA Hazy (*)     Ketones, UA Trace (*)     Leukocytes, UA Trace (*)     All other components within normal limits    Narrative:     Specimen Source->Urine   URINALYSIS MICROSCOPIC - Abnormal; Notable for the following components:    WBC, UA 7 (*)     All other components within normal limits    Narrative:     Specimen Source->Urine   TROPONIN I   MAGNESIUM   PHOSPHORUS   B-TYPE NATRIURETIC PEPTIDE   D DIMER, QUANTITATIVE   T4, FREE   TROPONIN I     EKG Readings: (Independently Interpreted)   Normal sinus rhythm normal intervals and axis no STEMI       Imaging Results              CTA Chest Non-Coronary (PE Studies) (Final result)  Result time 09/15/23 02:44:28      Final result by Leonard Giang MD (09/15/23 02:44:28)                    Impression:      There is no large central saddle type pulmonary embolus and there is no additional evidence for pulmonary embolus on this exam.    The lungs demonstrate mild atelectatic change.      Electronically signed by: Leonard Giang  Date:    09/15/2023  Time:    02:44               Narrative:    EXAMINATION:  CTA CHEST NON CORONARY (PE STUDIES)    CLINICAL HISTORY:  Pulmonary embolism (PE) suspected, high prob;    TECHNIQUE:  Low dose axial images, sagittal and coronal reformations were obtained from the thoracic inlet to the lung bases following the IV administration of 75 mL of Omnipaque 350.  Contrast timing was optimized to evaluate the pulmonary arteries.  MIP images were performed.    COMPARISON:  Chest radiograph September 15, 2023    FINDINGS:  There is no large central saddle type pulmonary embolus.  The pulmonary arterial vascular demonstrate opacification without evidence for abnormal pattern of pulmonary arterial filling defects specific for pulmonary emboli.    The thoracic aorta demonstrates opacification, appears normal in caliber.  There is no enlarged mediastinal or hilar adenopathy.  There is no evidence for pericardial effusion.    The lungs demonstrate mild motion artifact, mild atelectatic change.  There is no evidence for confluent infiltrate or consolidation.  There is no evidence for pleural effusion and no evidence for pneumothorax.    Limited imaging of the upper abdomen demonstrates no evidence for acute upper abdominal process.  The osseous structures appear intact with chronic change noted.                                       CT Head Without Contrast (Final result)  Result time 09/15/23 02:28:54      Final result by Wilbert King MD (09/15/23 02:28:54)                   Impression:      No CT evidence of acute intracranial abnormality.      Electronically signed by: Wilbert King MD  Date:    09/15/2023  Time:    02:28               Narrative:    EXAMINATION:  CT  HEAD WITHOUT CONTRAST    CLINICAL HISTORY:  Mental status change, unknown cause;    TECHNIQUE:  Low dose axial images were obtained through the head.  Coronal and sagittal reformations were also performed. Contrast was not administered.    COMPARISON:  05/10/2015.    FINDINGS:  No evidence of acute territorial infarct, hemorrhage, mass effect, or midline shift.    Ventricles are normal in size and configuration.    No displaced calvarial fracture.    Visualized paranasal sinuses and mastoid air cells are essentially clear.                                       X-Ray Chest AP Portable (Final result)  Result time 09/15/23 01:23:58      Final result by Leonard Giang MD (09/15/23 01:23:58)                   Impression:      Minimal atelectatic change noted, there is no additional radiographic evidence for superimposed acute intrathoracic process.      Electronically signed by: Leonard Giang  Date:    09/15/2023  Time:    01:23               Narrative:    EXAMINATION:  XR CHEST AP PORTABLE    CLINICAL HISTORY:  Chest pain, unspecified    TECHNIQUE:  Single frontal view of the chest was performed.    COMPARISON:  Chest radiograph May 8, 2009    FINDINGS:  Single portable chest view is submitted.  There is diminished depth of inspiration.  When accounting for position and technique and depth of inspiration, the appearance of the cardiomediastinal silhouette is appropriate.    There is no evidence for confluent infiltrate or consolidation, significant pleural effusion or pneumothorax.  Minimal atelectatic change noted.    The osseous structures appear intact.  Chronic change noted.                                       Medications   iohexoL (OMNIPAQUE 350) injection 75 mL (75 mLs Intravenous Given 9/15/23 0208)     Medical Decision Making  58-year-old female history of asthma presents the ER for evaluation of palpitations and syncope.  Patient reports that a few hours ago she was sitting down she had palpitations  chest discomfort then syncopized.  She had altered mentation, no convulsions.  EMS was activated and transported patient to the ER.  Patient arrived in the ER no acute distress her EKG was normal sinus rhythm without any acute ST changes.  Family reports that this has happened previously, and they are unsure why.  Patient reports that she has been feeling very overwhelmed, multiple life stressors.  She arrived in the ER she is neurologically intact but tearful with a reassuring physical exam.  Differential includes unprovoked syncope, arrhythmia, seizure, psychosomatic disorder versus other cause.  Will plan blood work cardiac workup CT scan reassess.    Amount and/or Complexity of Data Reviewed  Independent Historian: parent  Labs: ordered. Decision-making details documented in ED Course.  Radiology: ordered and independent interpretation performed. Decision-making details documented in ED Course.  ECG/medicine tests: ordered and independent interpretation performed.    Risk  Prescription drug management.  Decision regarding hospitalization.               ED Course as of 09/15/23 0612   Fri Sep 15, 2023   0424 Comprehensive metabolic panel(!) [SE]   0424 Phosphorus [SE]   0424 TSH(!) [SE]   0424 Troponin I [SE]   0424 T4, Free [SE]   0424 Brain natriuretic peptide [SE]   0424 Magnesium [SE]   0424 D dimer, quantitative [SE]   0424 CBC auto differential(!) [SE]   0424 Urinalysis, Reflex to Urine Culture Urine, Clean Catch(!) [SE]   0424 CTA Chest Non-Coronary (PE Studies) [SE]   0424 CT Head Without Contrast [SE]   0424 X-Ray Chest AP Portable  Resting in bed, no acute distress, labs imaging personally reviewed and interpreted.  Two set troponin within normal limits CMP no significant electrolyte abnormality identified CBC no leukocytosis no left shift UA trace leukocyte esterase but patient asymptomatic CTA chest x-ray and CT head personal interpretation no acute traumatic process identified.  I discussed with  patient.  She does endorse that she is under lot of stress believes it may be related to that.  Per [place] syncope risk score patient considered low risk.  I discussed with patient, she wishes to go home and prefers outpatient follow-up.  Will place referral for Neurology for possible seizure and Cardiology for possible cardiac cause syncope.  Return precautions discussed patient will be discharged. [SE]      ED Course User Index  [SE] Evans Roberts MD                    Clinical Impression:   Final diagnoses:  [R07.9] Chest pain  [R55] Syncope        ED Disposition Condition    Discharge Stable          ED Prescriptions    None       Follow-up Information       Follow up With Specialties Details Why Contact Info Additional Information    [facility] - [facility]iology - 3rd Fl Cardiology Schedule an appointment as soon as possible for a visit  As needed [address] [facility] [id number] [phone] Cardiology Services Clinics - 3rd floor    [facility] - Neurology 7th Fl Neurology Schedule an appointment as soon as possible for a visit  As needed [address] [facility] [phone] [facility] [place] - [facility], 7th Floor Please park in [facility] and take Clinic elevator             Evans Roberts MD  09/15/23 0612

## 2023-09-15 NOTE — Clinical Note
"April "April" Krys was seen and treated in our emergency department on 9/14/2023.  She may return to work on 09/18/2023.       If you have any questions or concerns, please don't hesitate to call.      Evans Roberts MD"

## 2023-09-15 NOTE — ED NOTES
April Altemus, a 58 y.o. female presents to the ED w/ complaint of Chest pain   C/o palpitations. Denies radiation. Generalized weakness      Triage note:  Chief Complaint   Patient presents with    Chest Pain     C/o non reproducible, generalized CP x20 minutes and lethargy. Denies sob     Review of patient's allergies indicates:   Allergen Reactions    Codeine Itching     Past Medical History:   Diagnosis Date    Abdominal pain, right lower quadrant     Asthma     PONV (postoperative nausea and vomiting)       APPEARANCE: No acute distress.    NEURO: Awake, alert, appropriate for age  HEENT: Head symmetrical. No obvious deformity  RESPIRATORY: Airway is open and patent. Respirations are spontaneous on room air.   NEUROVASCULAR: All extremities are warm and pink with capillary refill less than 3 seconds.   MUSCULOSKELETAL: Moves all extremities, wiggling toes and moving hands.   SKIN: Warm and dry, adequate turgor, mucus membranes moist and pink    Will continue to monitor.

## 2023-09-15 NOTE — TELEPHONE ENCOUNTER
----- Message from Osvaldo Camp sent at 9/15/2023  4:57 PM CDT -----  Regarding: Ambulatory referral/consult to Neurology  Good afternoon,       Patient is requesting a callback ASAP to schedule appointment in a week or sooner regarding referral/consult to Neurology. Patient is currently scheduled on 11/15/23 at 1:20 PM, and cannot wait this long due to condition. Please give the patient a callback at [phone] .     Dx : Epilepsy (Seizures, Convulsions, EMU follow-up)      Thank you,     STEVAN Camp

## 2023-09-19 ENCOUNTER — OFFICE VISIT (OUTPATIENT)
Dept: CARDIOLOGY | Facility: CLINIC | Age: 58
End: 2023-09-19
Payer: COMMERCIAL

## 2023-09-19 ENCOUNTER — HOSPITAL ENCOUNTER (OUTPATIENT)
Dept: CARDIOLOGY | Facility: HOSPITAL | Age: 58
Discharge: HOME | End: 2023-09-19
Attending: INTERNAL MEDICINE
Payer: COMMERCIAL

## 2023-09-19 VITALS
HEART RATE: 77 BPM | WEIGHT: 165 LBS | BODY MASS INDEX: 29.23 KG/M2 | HEIGHT: 63 IN | SYSTOLIC BLOOD PRESSURE: 140 MMHG | DIASTOLIC BLOOD PRESSURE: 98 MMHG

## 2023-09-19 VITALS — HEIGHT: 63 IN | WEIGHT: 165 LBS | BODY MASS INDEX: 29.23 KG/M2

## 2023-09-19 DIAGNOSIS — R07.2 PRECORDIAL PAIN: ICD-10-CM

## 2023-09-19 DIAGNOSIS — R55 VASOVAGAL SYNCOPE: ICD-10-CM

## 2023-09-19 LAB
ASCENDING AORTA: 2.62 CM
AV INDEX (PROSTH): 0.62
AV MEAN GRADIENT: 4 MMHG
AV PEAK GRADIENT: 8 MMHG
AV VALVE AREA BY VELOCITY RATIO: 2.12 CM²
AV VALVE AREA: 1.95 CM²
AV VELOCITY RATIO: 0.68
BSA FOR ECHO PROCEDURE: 1.82 M2
CV ECHO LV RWT: 0.44 CM
DOP CALC AO PEAK VEL: 1.42 M/S
DOP CALC AO VTI: 32.8 CM
DOP CALC LVOT AREA: 3.1 CM2
DOP CALC LVOT DIAMETER: 2 CM
DOP CALC LVOT PEAK VEL: 0.96 M/S
DOP CALC LVOT STROKE VOLUME: 64.06 CM3
DOP CALC MV VTI: 27.2 CM
DOP CALCLVOT PEAK VEL VTI: 20.4 CM
E WAVE DECELERATION TIME: 158.09 MSEC
E/A RATIO: 0.89
E/E' RATIO: 8.56 M/S
ECHO LV POSTERIOR WALL: 0.88 CM (ref 0.6–1.1)
FRACTIONAL SHORTENING: 35 % (ref 28–44)
INTERVENTRICULAR SEPTUM: 0.81 CM (ref 0.6–1.1)
LA MAJOR: 5.05 CM
LA MINOR: 4.92 CM
LA WIDTH: 3.6 CM
LEFT ATRIUM SIZE: 3.69 CM
LEFT ATRIUM VOLUME INDEX MOD: 24.3 ML/M2
LEFT ATRIUM VOLUME INDEX: 31.6 ML/M2
LEFT ATRIUM VOLUME MOD: 43.31 CM3
LEFT ATRIUM VOLUME: 56.28 CM3
LEFT INTERNAL DIMENSION IN SYSTOLE: 2.6 CM (ref 2.1–4)
LEFT VENTRICLE DIASTOLIC VOLUME INDEX: 39.11 ML/M2
LEFT VENTRICLE DIASTOLIC VOLUME: 69.62 ML
LEFT VENTRICLE MASS INDEX: 56 G/M2
LEFT VENTRICLE SYSTOLIC VOLUME INDEX: 13.8 ML/M2
LEFT VENTRICLE SYSTOLIC VOLUME: 24.59 ML
LEFT VENTRICULAR INTERNAL DIMENSION IN DIASTOLE: 3.99 CM (ref 3.5–6)
LEFT VENTRICULAR MASS: 100.21 G
LV LATERAL E/E' RATIO: 7.7 M/S
LV SEPTAL E/E' RATIO: 9.63 M/S
LVOT MG: 1.87 MMHG
LVOT MV: 0.65 CM/S
MV A" WAVE DURATION": 102.76 MSEC
MV PEAK A VEL: 0.87 M/S
MV PEAK E VEL: 0.77 M/S
MV PEAK GRADIENT: 3 MMHG
MV STENOSIS PRESSURE HALF TIME: 45.85 MS
MV VALVE AREA BY CONTINUITY EQUATION: 2.36 CM2
MV VALVE AREA P 1/2 METHOD: 4.8 CM2
OHS LV EJECTION FRACTION SIMPSONS BIPLANE MOD: 63 %
PISA MRMAX VEL: 2.13 M/S
PISA TR MAX VEL: 2.06 M/S
PULM VEIN S/D RATIO: 1.41
PV MV: 0.57 M/S
PV PEAK D VEL: 0.41 M/S
PV PEAK GRADIENT: 2 MMHG
PV PEAK S VEL: 0.58 M/S
PV PEAK VELOCITY: 0.79 M/S
RA MAJOR: 5.08 CM
RA PRESSURE ESTIMATED: 3 MMHG
RA WIDTH: 2.51 CM
RIGHT VENTRICULAR END-DIASTOLIC DIMENSION: 3.2 CM
RV TB RVSP: 5 MMHG
RV TISSUE DOPPLER FREE WALL SYSTOLIC VELOCITY 1 (APICAL 4 CHAMBER VIEW): 11 CM/S
SINUS: 2.53 CM
STJ: 2.19 CM
TDI LATERAL: 0.1 M/S
TDI SEPTAL: 0.08 M/S
TDI: 0.09 M/S
TR MAX PG: 17 MMHG
TRICUSPID ANNULAR PLANE SYSTOLIC EXCURSION: 2.48 CM
TV REST PULMONARY ARTERY PRESSURE: 20 MMHG
Z-SCORE OF LEFT VENTRICULAR DIMENSION IN END DIASTOLE: -2.1
Z-SCORE OF LEFT VENTRICULAR DIMENSION IN END SYSTOLE: -1.24

## 2023-09-19 PROCEDURE — 1159F PR MEDICATION LIST DOCUMENTED IN MEDICAL RECORD: ICD-10-PCS | Mod: CPTII,S$GLB,, | Performed by: INTERNAL MEDICINE

## 2023-09-19 PROCEDURE — 3008F PR BODY MASS INDEX (BMI) DOCUMENTED: ICD-10-PCS | Mod: CPTII,S$GLB,, | Performed by: INTERNAL MEDICINE

## 2023-09-19 PROCEDURE — 3077F SYST BP >= 140 MM HG: CPT | Mod: CPTII,S$GLB,, | Performed by: INTERNAL MEDICINE

## 2023-09-19 PROCEDURE — 1160F PR REVIEW ALL MEDS BY PRESCRIBER/CLIN PHARMACIST DOCUMENTED: ICD-10-PCS | Mod: CPTII,S$GLB,, | Performed by: INTERNAL MEDICINE

## 2023-09-19 PROCEDURE — 93306 TTE W/DOPPLER COMPLETE: CPT

## 2023-09-19 PROCEDURE — 99999 PR PBB SHADOW E&M-EST. PATIENT-LVL IV: CPT | Mod: PBBFAC,,, | Performed by: INTERNAL MEDICINE

## 2023-09-19 PROCEDURE — 1159F MED LIST DOCD IN RCRD: CPT | Mod: CPTII,S$GLB,, | Performed by: INTERNAL MEDICINE

## 2023-09-19 PROCEDURE — 99204 PR OFFICE/OUTPT VISIT, NEW, LEVL IV, 45-59 MIN: ICD-10-PCS | Mod: S$GLB,,, | Performed by: INTERNAL MEDICINE

## 2023-09-19 PROCEDURE — 3080F DIAST BP >= 90 MM HG: CPT | Mod: CPTII,S$GLB,, | Performed by: INTERNAL MEDICINE

## 2023-09-19 PROCEDURE — 99204 OFFICE O/P NEW MOD 45 MIN: CPT | Mod: S$GLB,,, | Performed by: INTERNAL MEDICINE

## 2023-09-19 PROCEDURE — 93306 ECHO (CUPID ONLY): ICD-10-PCS | Mod: 26,,, | Performed by: INTERNAL MEDICINE

## 2023-09-19 PROCEDURE — 3080F PR MOST RECENT DIASTOLIC BLOOD PRESSURE >= 90 MM HG: ICD-10-PCS | Mod: CPTII,S$GLB,, | Performed by: INTERNAL MEDICINE

## 2023-09-19 PROCEDURE — 1160F RVW MEDS BY RX/DR IN RCRD: CPT | Mod: CPTII,S$GLB,, | Performed by: INTERNAL MEDICINE

## 2023-09-19 PROCEDURE — 93306 TTE W/DOPPLER COMPLETE: CPT | Mod: 26,,, | Performed by: INTERNAL MEDICINE

## 2023-09-19 PROCEDURE — 99999 PR PBB SHADOW E&M-EST. PATIENT-LVL IV: ICD-10-PCS | Mod: PBBFAC,,, | Performed by: INTERNAL MEDICINE

## 2023-09-19 PROCEDURE — 3008F BODY MASS INDEX DOCD: CPT | Mod: CPTII,S$GLB,, | Performed by: INTERNAL MEDICINE

## 2023-09-19 PROCEDURE — 3077F PR MOST RECENT SYSTOLIC BLOOD PRESSURE >= 140 MM HG: ICD-10-PCS | Mod: CPTII,S$GLB,, | Performed by: INTERNAL MEDICINE

## 2023-09-19 RX ORDER — CETIRIZINE HYDROCHLORIDE 10 MG/1
10 TABLET ORAL DAILY
COMMUNITY

## 2023-09-19 NOTE — PROGRESS NOTES
Subjective:   09/19/2023     Patient ID:  April Krys is a 58 y.o. female who presents for evaulation of Chest Pain and Hospital Follow Up        Patient with a history is 1 episode of syncope presents with another episode of syncope, she would not eaten all day, she has been under lot of stress, had bad news and did have an episode of syncope.  She is no exertional chest pains or tightness, PND orthopnea.  She exercises regularly.  Family history is negative for premature death.  She was seen in the emergency room.  Workup was unremarkable.  EKG was normal.        Past Medical History:   Diagnosis Date    Abdominal pain, right lower quadrant     Asthma     PONV (postoperative nausea and vomiting)        Review of patient's allergies indicates:   Allergen Reactions    Codeine Itching         Current Outpatient Medications:     azelastine (ASTELIN) 137 mcg (0.1 %) nasal spray, SPRAY 2 SPRAYS INTO EACH NOSTRIL TWICE A DAY, Disp: , Rfl: 0    cetirizine (ZYRTEC) 10 MG tablet, Take 10 mg by mouth once daily. PRN, Disp: , Rfl:     VENTOLIN HFA 90 mcg/actuation inhaler, INHALE 1-2 PUFFS EVERY 4-6 HOURS, Disp: , Rfl: 0     Objective:   Review of Systems   Cardiovascular:  Positive for chest pain and syncope. Negative for claudication, cyanosis, dyspnea on exertion, irregular heartbeat, leg swelling, near-syncope, orthopnea, palpitations and paroxysmal nocturnal dyspnea.         Vitals:    09/19/23 0826   BP: (!) 140/98   Pulse: 77     Wt Readings from Last 3 Encounters:   09/19/23 74.8 kg (165 lb)   01/31/23 78.9 kg (173 lb 15.1 oz)   11/25/19 93.9 kg (207 lb)     Temp Readings from Last 3 Encounters:   09/15/23 98.2 °F (36.8 °C) (Oral)   11/18/19 98.6 °F (37 °C) (Oral)   10/15/13 99.6 °F (37.6 °C) (Tympanic)     BP Readings from Last 3 Encounters:   09/19/23 (!) 140/98   09/15/23 123/80   01/31/23 122/86     Pulse Readings from Last 3 Encounters:   09/19/23 77   09/15/23 76   11/25/19 81             Physical Exam  Vitals  "reviewed.   Constitutional:       General: She is not in acute distress.     Appearance: She is well-developed.   HENT:      Head: Normocephalic and atraumatic.      Nose: Nose normal.   Eyes:      Conjunctiva/sclera: Conjunctivae normal.      Pupils: Pupils are equal, round, and reactive to light.   Neck:      Vascular: No carotid bruit or JVD.   Cardiovascular:      Rate and Rhythm: Normal rate and regular rhythm.      Pulses: Normal pulses and intact distal pulses.      Heart sounds: Normal heart sounds. No murmur heard.     No friction rub. No gallop.   Pulmonary:      Effort: Pulmonary effort is normal. No respiratory distress.      Breath sounds: Normal breath sounds. No wheezing or rales.   Chest:      Chest wall: No tenderness.   Abdominal:      General: Bowel sounds are normal. There is no distension.      Palpations: Abdomen is soft.      Tenderness: There is no abdominal tenderness.   Musculoskeletal:         General: No tenderness or deformity. Normal range of motion.      Cervical back: Normal range of motion and neck supple.      Right lower leg: No edema.      Left lower leg: No edema.   Skin:     General: Skin is warm and dry.      Findings: No erythema or rash.   Neurological:      Mental Status: She is alert and oriented to person, place, and time.      Cranial Nerves: No cranial nerve deficit.      Motor: No abnormal muscle tone.      Coordination: Coordination normal.   Psychiatric:         Behavior: Behavior normal.         Thought Content: Thought content normal.         Judgment: Judgment normal.           No results found for: "CHOL"  No results found for: "HDL"  No results found for: "LDLCALC"  Lab Results   Component Value Date    ALT 11 09/15/2023    AST 17 09/15/2023     Lab Results   Component Value Date    CREATININE 0.8 09/15/2023    BUN 15 09/15/2023     09/15/2023    K 3.6 09/15/2023    CO2 22 (L) 09/15/2023    CO2 22 (L) 05/08/2009    CO2 33 (H) 01/18/2006     Lab Results "   Component Value Date    HGB 13.4 09/15/2023    HCT 43.1 09/15/2023    HCT 32.7 (L) 05/27/2009    HCT 35.5 (L) 05/08/2009         CT independent review shows no coronary calcification, no  atherosclerotic office     EKG independent review shows normal sinus rhythm, normal EKG              Assessment and Plan:     Chest pain  -     Ambulatory referral/consult to Cardiology  -     Echo; Future; Expected date: 09/20/2023  -     Cardiac Monitor - 3-15 Day Adult (Cupid Only); Future; Expected date: 09/20/2023    Syncope  -     Ambulatory referral/consult to Cardiology  -     Echo; Future; Expected date: 09/20/2023  -     Cardiac Monitor - 3-15 Day Adult (Cupid Only); Future; Expected date: 09/20/2023        Syncope probably vasovagal, will obtain a echocardiogram and 5 day monitor to rule out dysrhythmia and structural heart disease.  Otherwise, chest pain atypical, no angina.  No follow-ups on file.          Future Appointments   Date Time Provider Department Center   10/3/2023 12:00 PM Chapis Patel MD [facility]

## 2023-09-20 ENCOUNTER — CLINICAL SUPPORT (OUTPATIENT)
Dept: CARDIOLOGY | Facility: HOSPITAL | Age: 58
End: 2023-09-20
Attending: INTERNAL MEDICINE
Payer: COMMERCIAL

## 2023-09-20 DIAGNOSIS — R55 VASOVAGAL SYNCOPE: ICD-10-CM

## 2023-09-20 DIAGNOSIS — R07.2 PRECORDIAL PAIN: ICD-10-CM

## 2023-09-20 PROCEDURE — 93244 EXT ECG>48HR<7D REV&INTERPJ: CPT | Mod: ,,, | Performed by: INTERNAL MEDICINE

## 2023-09-20 PROCEDURE — 93244 CV CARDIAC MONITOR - 3-15 DAY ADULT (CUPID ONLY): ICD-10-PCS | Mod: ,,, | Performed by: INTERNAL MEDICINE

## 2023-09-24 ENCOUNTER — OFFICE VISIT (OUTPATIENT)
Dept: URGENT CARE | Facility: CLINIC | Age: 58
End: 2023-09-24
Payer: COMMERCIAL

## 2023-09-24 VITALS
TEMPERATURE: 98 F | HEART RATE: 80 BPM | OXYGEN SATURATION: 98 % | BODY MASS INDEX: 29.23 KG/M2 | HEIGHT: 63 IN | SYSTOLIC BLOOD PRESSURE: 133 MMHG | WEIGHT: 165 LBS | RESPIRATION RATE: 18 BRPM | DIASTOLIC BLOOD PRESSURE: 85 MMHG

## 2023-09-24 DIAGNOSIS — M79.641 RIGHT HAND PAIN: Primary | ICD-10-CM

## 2023-09-24 PROCEDURE — 73130 X-RAY EXAM OF HAND: CPT | Mod: FY,RT,S$GLB, | Performed by: RADIOLOGY

## 2023-09-24 PROCEDURE — 99203 PR OFFICE/OUTPT VISIT, NEW, LEVL III, 30-44 MIN: ICD-10-PCS | Mod: S$GLB,,,

## 2023-09-24 PROCEDURE — 73130 XR HAND COMPLETE 3 VIEW RIGHT: ICD-10-PCS | Mod: FY,RT,S$GLB, | Performed by: RADIOLOGY

## 2023-09-24 PROCEDURE — 99203 OFFICE O/P NEW LOW 30 MIN: CPT | Mod: S$GLB,,,

## 2023-09-24 NOTE — PATIENT INSTRUCTIONS
A referral for Orthopedics has been placed. Call [phone] to schedule an appointment. Make sure to follow up in 1-2 weeks or sooner if symptoms continue or worsen.     - Rest.    - Drink plenty of fluids.    - Acetaminophen (tylenol) as directed as needed for fever/pain. Avoid tylenol if you have a history of liver disease.   - Tylenol dosing for adults: [By mouth route, immediate-release form] Dose: 325-1000 mg by mouth every 4-6h as needed; Max: 1 g/4h and 4 g/day from all sources. [By mouth route, extended-release form] Dose: 650-1300 mg Extended Release by mouth every 8h as needed; Max: 4 g/day from all sources.     - You must understand that you have received an Urgent Care treatment only and that you may be released before all of your medical problems are known or treated.   - You, the patient, will arrange for follow up care as instructed.   - If your condition worsens or fails to improve we recommend that you receive another evaluation at the ER immediately or contact your PCP to discuss your concerns or return here.   - Follow up with your PCP or specialty clinic as directed in the next 1-2 weeks if not improved or as needed.  You can call [phone] to schedule an appointment with the appropriate provider.    If your symptoms do not improve or worsen, go to the emergency room immediately.
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
## 2023-09-27 LAB
OHS CV HOLTER SINUS AVERAGE HR: 82
OHS CV HOLTER SINUS MAX HR: 152
OHS CV HOLTER SINUS MIN HR: 51

## 2023-10-03 ENCOUNTER — OFFICE VISIT (OUTPATIENT)
Dept: NEUROLOGY | Facility: CLINIC | Age: 58
End: 2023-10-03
Payer: COMMERCIAL

## 2023-10-03 VITALS — BODY MASS INDEX: 29.59 KG/M2 | HEIGHT: 63 IN | WEIGHT: 167 LBS

## 2023-10-03 DIAGNOSIS — R55 SYNCOPE: ICD-10-CM

## 2023-10-03 PROCEDURE — 3008F BODY MASS INDEX DOCD: CPT | Mod: CPTII,S$GLB,, | Performed by: STUDENT IN AN ORGANIZED HEALTH CARE EDUCATION/TRAINING PROGRAM

## 2023-10-03 PROCEDURE — 99999 PR PBB SHADOW E&M-EST. PATIENT-LVL III: ICD-10-PCS | Mod: PBBFAC,,, | Performed by: STUDENT IN AN ORGANIZED HEALTH CARE EDUCATION/TRAINING PROGRAM

## 2023-10-03 PROCEDURE — 99999 PR PBB SHADOW E&M-EST. PATIENT-LVL III: CPT | Mod: PBBFAC,,, | Performed by: STUDENT IN AN ORGANIZED HEALTH CARE EDUCATION/TRAINING PROGRAM

## 2023-10-03 PROCEDURE — 1159F MED LIST DOCD IN RCRD: CPT | Mod: CPTII,S$GLB,, | Performed by: STUDENT IN AN ORGANIZED HEALTH CARE EDUCATION/TRAINING PROGRAM

## 2023-10-03 PROCEDURE — 99204 PR OFFICE/OUTPT VISIT, NEW, LEVL IV, 45-59 MIN: ICD-10-PCS | Mod: S$GLB,,, | Performed by: STUDENT IN AN ORGANIZED HEALTH CARE EDUCATION/TRAINING PROGRAM

## 2023-10-03 PROCEDURE — 3008F PR BODY MASS INDEX (BMI) DOCUMENTED: ICD-10-PCS | Mod: CPTII,S$GLB,, | Performed by: STUDENT IN AN ORGANIZED HEALTH CARE EDUCATION/TRAINING PROGRAM

## 2023-10-03 PROCEDURE — 1159F PR MEDICATION LIST DOCUMENTED IN MEDICAL RECORD: ICD-10-PCS | Mod: CPTII,S$GLB,, | Performed by: STUDENT IN AN ORGANIZED HEALTH CARE EDUCATION/TRAINING PROGRAM

## 2023-10-03 PROCEDURE — 99204 OFFICE O/P NEW MOD 45 MIN: CPT | Mod: S$GLB,,, | Performed by: STUDENT IN AN ORGANIZED HEALTH CARE EDUCATION/TRAINING PROGRAM

## 2023-10-03 PROCEDURE — 1160F RVW MEDS BY RX/DR IN RCRD: CPT | Mod: CPTII,S$GLB,, | Performed by: STUDENT IN AN ORGANIZED HEALTH CARE EDUCATION/TRAINING PROGRAM

## 2023-10-03 PROCEDURE — 1160F PR REVIEW ALL MEDS BY PRESCRIBER/CLIN PHARMACIST DOCUMENTED: ICD-10-PCS | Mod: CPTII,S$GLB,, | Performed by: STUDENT IN AN ORGANIZED HEALTH CARE EDUCATION/TRAINING PROGRAM
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
## 2023-10-03 NOTE — PATIENT INSTRUCTIONS
VISIT FOLLOW UP    It was nice to see you today.  Here is what we discussed at your visit:    You were seen for an episode of loss of consciousness.  This was likely vasovagal syncope.  The other episodes that you have had in the past could possibly be seizures, but at this time it is indeterminate.  We will do further studies to investigate your risk for seizures.    - Recommend increasing water intake  - Continue to follow up with cardiology  - For workup: EEG (brainwave study)  - Keep a log of when you have episodes of staring  - Follow up in 3 months    Dr. PABLO's contact information: office phone [phone], or contact via [employer]

## 2024-11-25 DIAGNOSIS — H00.029 HORDEOLUM INTERNUM, UNSPECIFIED LATERALITY: Primary | ICD-10-CM

## 2024-11-25 RX ORDER — FLUCONAZOLE 100 MG/1
100 TABLET ORAL DAILY
Qty: 10 TABLET | Refills: 0 | Status: SHIPPED | OUTPATIENT
Start: 2024-11-25 | End: 2024-12-05

## 2024-11-25 RX ORDER — AMOXICILLIN AND CLAVULANATE POTASSIUM 500; 125 MG/1; MG/1
1 TABLET, FILM COATED ORAL 3 TIMES DAILY
Qty: 30 TABLET | Refills: 0 | Status: SHIPPED | OUTPATIENT
Start: 2024-11-25 | End: 2024-12-05

## 2024-11-27 ENCOUNTER — OFFICE VISIT (OUTPATIENT)
Dept: OPTOMETRY | Facility: CLINIC | Age: 59
End: 2024-11-27
Payer: COMMERCIAL

## 2024-11-27 DIAGNOSIS — H25.13 SENILE NUCLEAR SCLEROSIS, BILATERAL: ICD-10-CM

## 2024-11-27 DIAGNOSIS — H00.029 HORDEOLUM INTERNUM, UNSPECIFIED LATERALITY: ICD-10-CM

## 2024-11-27 DIAGNOSIS — H52.4 BILATERAL PRESBYOPIA: ICD-10-CM

## 2024-11-27 DIAGNOSIS — H43.393 VISUAL FLOATERS, BILATERAL: Primary | ICD-10-CM

## 2024-11-27 PROCEDURE — 1160F RVW MEDS BY RX/DR IN RCRD: CPT | Mod: CPTII,S$GLB,, | Performed by: OPTOMETRIST

## 2024-11-27 PROCEDURE — 1159F MED LIST DOCD IN RCRD: CPT | Mod: CPTII,S$GLB,, | Performed by: OPTOMETRIST

## 2024-11-27 PROCEDURE — 99999 PR PBB SHADOW E&M-EST. PATIENT-LVL II: CPT | Mod: PBBFAC,,, | Performed by: OPTOMETRIST

## 2024-11-27 PROCEDURE — 92004 COMPRE OPH EXAM NEW PT 1/>: CPT | Mod: S$GLB,,, | Performed by: OPTOMETRIST
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
## 2024-12-27 ENCOUNTER — PATIENT MESSAGE (OUTPATIENT)
Dept: OPTOMETRY | Facility: CLINIC | Age: 59
End: 2024-12-27
Payer: COMMERCIAL

## 2025-04-23 DIAGNOSIS — H00.026 MEIBOMIANITIS OF LEFT EYE: Primary | ICD-10-CM

## 2025-04-23 RX ORDER — NEOMYCIN SULFATE, POLYMYXIN B SULFATE AND DEXAMETHASONE 3.5; 10000; 1 MG/ML; [USP'U]/ML; MG/ML
1 SUSPENSION/ DROPS OPHTHALMIC 4 TIMES DAILY
Qty: 5 ML | Refills: 0 | Status: SHIPPED | OUTPATIENT
Start: 2025-04-23 | End: 2025-04-30

## 2025-04-29 ENCOUNTER — TELEPHONE (OUTPATIENT)
Dept: PULMONOLOGY | Facility: CLINIC | Age: 60
End: 2025-04-29
Payer: COMMERCIAL

## 2025-04-29 DIAGNOSIS — J45.909 ASTHMA, UNSPECIFIED ASTHMA SEVERITY, UNSPECIFIED WHETHER COMPLICATED, UNSPECIFIED WHETHER PERSISTENT: Primary | ICD-10-CM

## 2025-05-02 ENCOUNTER — HOSPITAL ENCOUNTER (OUTPATIENT)
Dept: PULMONOLOGY | Facility: CLINIC | Age: 60
Discharge: HOME | End: 2025-05-02
Payer: COMMERCIAL

## 2025-05-02 ENCOUNTER — OFFICE VISIT (OUTPATIENT)
Dept: PULMONOLOGY | Facility: CLINIC | Age: 60
End: 2025-05-02
Payer: COMMERCIAL

## 2025-05-02 ENCOUNTER — LAB VISIT (OUTPATIENT)
Dept: LAB | Facility: HOSPITAL | Age: 60
End: 2025-05-02
Attending: INTERNAL MEDICINE
Payer: COMMERCIAL

## 2025-05-02 VITALS
OXYGEN SATURATION: 99 % | SYSTOLIC BLOOD PRESSURE: 125 MMHG | HEIGHT: 63 IN | DIASTOLIC BLOOD PRESSURE: 70 MMHG | WEIGHT: 183 LBS | HEART RATE: 80 BPM | BODY MASS INDEX: 32.43 KG/M2

## 2025-05-02 DIAGNOSIS — J31.0 CHRONIC RHINITIS: ICD-10-CM

## 2025-05-02 DIAGNOSIS — J45.909 ASTHMA, UNSPECIFIED ASTHMA SEVERITY, UNSPECIFIED WHETHER COMPLICATED, UNSPECIFIED WHETHER PERSISTENT: ICD-10-CM

## 2025-05-02 DIAGNOSIS — J45.40 MODERATE PERSISTENT ASTHMA WITHOUT COMPLICATION: ICD-10-CM

## 2025-05-02 DIAGNOSIS — J45.40 MODERATE PERSISTENT ASTHMA WITHOUT COMPLICATION: Primary | ICD-10-CM

## 2025-05-02 LAB
ABSOLUTE EOSINOPHIL (OHS): 0.03 K/UL
ABSOLUTE MONOCYTE (OHS): 0.65 K/UL (ref 0.3–1)
ABSOLUTE NEUTROPHIL COUNT (OHS): 5.26 K/UL (ref 1.8–7.7)
BASOPHILS # BLD AUTO: 0.03 K/UL
BASOPHILS NFR BLD AUTO: 0.4 %
DLCO SINGLE BREATH LLN: 16.06
DLCO SINGLE BREATH PRE REF: 98.8 %
DLCO SINGLE BREATH REF: 21.8
DLCOC SBVA LLN: 3.13
DLCOC SBVA REF: 4.73
DLCOC SINGLE BREATH LLN: 16.06
DLCOC SINGLE BREATH REF: 21.8
DLCOCSBVAULN: 6.34
DLCOCSINGLEBREATHULN: 27.53
DLCOSINGLEBREATHULN: 27.53
DLCOSINGLEBREATHZSCORE: -0.07
DLCOVA LLN: 3.13
DLCOVA PRE REF: 100 %
DLCOVA PRE: 4.74 ML/(MIN*MMHG*L) (ref 3.13–6.34)
DLCOVA REF: 4.73
DLCOVAULN: 6.34
ERV LLN: -16449.19
ERV PRE REF: 50.4 %
ERV REF: 0.81
ERVULN: ABNORMAL
ERYTHROCYTE [DISTWIDTH] IN BLOOD BY AUTOMATED COUNT: 13.9 % (ref 11.5–14.5)
FEF 25 75 LLN: 1.52
FEF 25 75 PRE REF: 55.2 %
FEF 25 75 REF: 2.92
FEV05 LLN: 0.92
FEV05 REF: 1.77
FEV1 FVC LLN: 69
FEV1 FVC PRE REF: 94.1 %
FEV1 FVC REF: 80
FEV1 LLN: 1.49
FEV1 PRE REF: 100.1 %
FEV1 REF: 2.04
FEV1FVCZSCORE: -0.71
FEV1ZSCORE: 0.01
FRCPLETH LLN: 1.76
FRCPLETH PREREF: 90.9 %
FRCPLETH REF: 2.59
FRCPLETHULN: 3.41
FVC LLN: 1.89
FVC PRE REF: 106.1 %
FVC REF: 2.55
FVCZSCORE: 0.37
HCT VFR BLD AUTO: 43.3 % (ref 37–48.5)
HGB BLD-MCNC: 13.4 GM/DL (ref 12–16)
IGE SERPL-ACNC: 31 IU/ML
IMM GRANULOCYTES # BLD AUTO: 0.02 K/UL (ref 0–0.04)
IMM GRANULOCYTES NFR BLD AUTO: 0.2 % (ref 0–0.5)
IVC PRE: 2.91 L (ref 1.89–3.24)
IVC SINGLE BREATH LLN: 1.89
IVC SINGLE BREATH PRE REF: 114.1 %
IVC SINGLE BREATH REF: 2.55
IVCSINGLEBREATHULN: 3.24
LLN IC: -16448.14
LYMPHOCYTES # BLD AUTO: 2.13 K/UL (ref 1–4.8)
MCH RBC QN AUTO: 26 PG (ref 27–31)
MCHC RBC AUTO-ENTMCNC: 30.9 G/DL (ref 32–36)
MCV RBC AUTO: 84 FL (ref 82–98)
NUCLEATED RBC (/100WBC) (OHS): 0 /100 WBC
PEF LLN: 3.53
PEF PRE REF: 89.5 %
PEF REF: 5.41
PHYSICIAN COMMENT: ABNORMAL
PLATELET # BLD AUTO: 312 K/UL (ref 150–450)
PMV BLD AUTO: 9.3 FL (ref 9.2–12.9)
PRE DLCO: 21.54 ML/(MIN*MMHG) (ref 16.06–27.53)
PRE ERV: 0.41 L (ref -16449.19–16450.81)
PRE FEF 25 75: 1.61 L/S (ref 1.52–4.31)
PRE FET 100: 8.76 SEC
PRE FEV05 REF: 89.2 %
PRE FEV1 FVC: 75.43 % (ref 68.56–90.11)
PRE FEV1: 2.04 L (ref 1.49–2.56)
PRE FEV5: 1.58 L (ref 0.92–2.63)
PRE FRC PL: 2.35 L (ref 1.76–3.41)
PRE FVC: 2.7 L (ref 1.89–3.24)
PRE IC: 2.5 L (ref -16448.14–16451.86)
PRE PEF: 4.85 L/S (ref 3.53–7.29)
PRE REF IC: 134.5 %
PRE RV: 1.94 L (ref 1.2–2.35)
PRE TLC: 4.85 L (ref 3.62–5.59)
RAW PRE REF: 183.7 %
RAW PRE: 5.62 CMH2O*S/L (ref 3.06–3.06)
RAW REF: 3.06
RBC # BLD AUTO: 5.15 M/UL (ref 4–5.4)
REF IC: 1.86
RELATIVE EOSINOPHIL (OHS): 0.4 %
RELATIVE LYMPHOCYTE (OHS): 26.2 % (ref 18–48)
RELATIVE MONOCYTE (OHS): 8 % (ref 4–15)
RELATIVE NEUTROPHIL (OHS): 64.8 % (ref 38–73)
RV LLN: 1.2
RV PRE REF: 109.3 %
RV REF: 1.78
RVTLC LLN: 29
RVTLC PRE REF: 103.6 %
RVTLC PRE: 40.08 % (ref 29.09–48.27)
RVTLC REF: 39
RVTLCULN: 48
RVULN: 2.35
SGAW PRE REF: 62.8 %
SGAW PRE: 0.06 1/(CMH2O*S) (ref 0.1–0.1)
SGAW REF: 0.1
TLC LLN: 3.62
TLC PRE REF: 105.4 %
TLC REF: 4.6
TLC ULN: 5.59
TLCZSCORE: 0.41
ULN IC: ABNORMAL
VA PRE: 4.55 L (ref 4.45–4.45)
VA SINGLE BREATH LLN: 4.45
VA SINGLE BREATH PRE REF: 102.1 %
VA SINGLE BREATH REF: 4.45
VASINGLEBREATHULN: 4.45
VC LLN: 1.89
VC PRE REF: 114.1 %
VC PRE: 2.91 L (ref 1.89–3.24)
VC REF: 2.55
VC ULN: 3.24
WBC # BLD AUTO: 8.12 K/UL (ref 3.9–12.7)

## 2025-05-02 PROCEDURE — 82785 ASSAY OF IGE: CPT

## 2025-05-02 PROCEDURE — 36415 COLL VENOUS BLD VENIPUNCTURE: CPT

## 2025-05-02 PROCEDURE — 99999 PR PBB SHADOW E&M-EST. PATIENT-LVL III: CPT | Mod: PBBFAC,,, | Performed by: INTERNAL MEDICINE

## 2025-05-02 PROCEDURE — 85025 COMPLETE CBC W/AUTO DIFF WBC: CPT

## 2025-05-02 RX ORDER — BUDESONIDE AND FORMOTEROL FUMARATE DIHYDRATE 160; 4.5 UG/1; UG/1
AEROSOL RESPIRATORY (INHALATION)
Qty: 10.2 G | Refills: 2 | Status: SHIPPED | OUTPATIENT
Start: 2025-05-02 | End: 2025-05-02 | Stop reason: SDUPTHER

## 2025-05-02 RX ORDER — BUDESONIDE AND FORMOTEROL FUMARATE DIHYDRATE 160; 4.5 UG/1; UG/1
AEROSOL RESPIRATORY (INHALATION)
Qty: 10.2 G | Refills: 2 | Status: SHIPPED | OUTPATIENT
Start: 2025-05-02

## 2025-05-02 RX ORDER — ALBUTEROL SULFATE 90 UG/1
2 INHALANT RESPIRATORY (INHALATION) EVERY 4 HOURS PRN
Qty: 18 G | Refills: 3 | Status: SHIPPED | OUTPATIENT
Start: 2025-05-02 | End: 2026-05-02

## 2025-05-02 RX ORDER — FLUTICASONE PROPIONATE AND SALMETEROL XINAFOATE 115; 21 UG/1; UG/1
AEROSOL, METERED RESPIRATORY (INHALATION)
Qty: 12 G | Refills: 3 | Status: SHIPPED | OUTPATIENT
Start: 2025-05-02 | End: 2025-05-02

## 2025-05-02 NOTE — PROGRESS NOTES
"History & Physical  [facility]    SUBJECTIVE:     Chief Complaint:   Asthma    History of Present Illness:  April Krys is a 59 y.o. female who presents for evaluation of asthma. She had asthma as a child. She feels like over the past several years, her symptoms have returned. Symptoms include shortness of breath, wheezing, & coughing. Associated symptoms include sinus congestion, itchy throat, & post-nasal drip. Symptoms are relieved by steroid injections & albuterol.    She took an abx injection & steroid injection nine days ago with Dr Gordon ENT for sinusitis. She continues to experience sinus pain today particularly along the left maxillary sinus & plans to alert Dr Gordon. She has taken several steroid injections over the past year.    She takes flonase every morning. She takes zyrtec.    She is a lifetime nonsmoker.    She is retired. She used work as a .    No pets. Wood floors in her home with carpet in the bedrooms. The carpet was just installed six months ago.    Review of patient's allergies indicates:   Allergen Reactions    Codeine Itching       Past Medical History:   Diagnosis Date    Abdominal pain, right lower quadrant     Asthma     PONV (postoperative nausea and vomiting)      Past Surgical History:   Procedure Laterality Date    CHOLECYSTECTOMY      HYSTERECTOMY  2009    KJB---TLH, FIBROIDS, BLEEDING    OOPHORECTOMY  2006    AP---RSO     Family History   Problem Relation Name Age of Onset    Breast cancer Mother      Breast cancer Sister  40    Colon cancer Neg Hx      Ovarian cancer Neg Hx       Social History[1]  Review of Systems:  No pertinent positives.    OBJECTIVE:     Vital Signs  Vitals:    05/02/25 0848   BP: 125/70   BP Location: Right arm   Patient Position: Sitting   Pulse: 80   SpO2: 99%   Weight: 83 kg (182 lb 15.7 oz)   Height: 5' 3" (1.6 m)     Body mass index is 32.41 kg/m².    Physical Exam:  General: no distress  Eyes:  conjunctivae/corneas " clear  Nose: no discharge  Neck: trachea midline with no masses appreciated  Lungs:  normal respiratory effort, no wheezes, no rales  Heart: regular rate and rhythm and no murmur  Abdomen: non-distended  Extremities: no cyanosis, no edema, no clubbing  Skin: No rashes or lesions. good skin turgor  Neurologic: alert, oriented, thought content appropriate    Laboratory:  Lab Results   Component Value Date    WBC 7.98 09/15/2023    HGB 13.4 09/15/2023    HCT 43.1 09/15/2023    MCV 86 09/15/2023     09/15/2023     IgE normal  Eos normal    Chest Imaging, My Impression:   CT Chest 9/2023: lungs are clear    Diagnostic Results:  PFT Today: no obstruction, no restriction, diffusing capacity was normal.    ASSESSMENT/PLAN:     Moderate Persistent Asthma  - Start controller therapy: symbicort. Can scale up or down on the dose based on symptoms. Can adjust the dose from zero puffs per day up to two puffs twice a day. Rinse mouth after each use.  - Continue albuterol prn.  - PFT is normal today which is reassuring. Reviewed this together.  - Check eos & IgE -> normal. Reviewed this together.  - Initial rx for advair hfa was reported to me as not covered by the patient's insurer by the pharmacy. They did not suggest an alternative ICS/LABA inhaler that was covered. I personally called the pt's pharmacy & asked them to run a test script for an alternative inhaler. We found that brand-name symbicort was covered by the patient's insurer & went through with a co-pay of 150$. While this is high, there was no suggested ICS/LABA that was more affordable. Will use the brand name symbicort rx & cancel the advair.    Acute on chronic rhinosinusitis  - Continue flonase & zyrtec. Follow up with Dr Gordon (ENT).    Total professional time spent for the encounter: 60 minutes  Time was spent preparing to see the patient, reviewing results of prior testing, obtaining and/or reviewing separately obtained history, performing a medically  appropriate examination and interview, counseling and educating the patient/family, ordering medications/tests/procedures, referring and communicating with other health care professionals, documenting clinical information in the electronic health record, and independently interpreting results.    Paulo Gonzales, MD  [facility]         [1]   Social History  Socioeconomic History    Marital status:    Tobacco Use    Smoking status: Never    Smokeless tobacco: Never   Substance and Sexual Activity    Alcohol use: No    Drug use: No    Sexual activity: Yes     Partners: Male     Birth control/protection: None

## 2025-05-14 ENCOUNTER — OFFICE VISIT (OUTPATIENT)
Dept: OBSTETRICS AND GYNECOLOGY | Facility: CLINIC | Age: 60
End: 2025-05-14
Payer: COMMERCIAL

## 2025-05-14 VITALS
HEIGHT: 63 IN | DIASTOLIC BLOOD PRESSURE: 97 MMHG | SYSTOLIC BLOOD PRESSURE: 140 MMHG | WEIGHT: 189.38 LBS | BODY MASS INDEX: 33.55 KG/M2

## 2025-05-14 DIAGNOSIS — Z90.710 HISTORY OF HYSTERECTOMY: ICD-10-CM

## 2025-05-14 DIAGNOSIS — Z01.419 WELL WOMAN EXAM WITH ROUTINE GYNECOLOGICAL EXAM: ICD-10-CM

## 2025-05-14 DIAGNOSIS — K64.9 HEMORRHOIDS, UNSPECIFIED HEMORRHOID TYPE: Primary | ICD-10-CM

## 2025-05-14 PROCEDURE — 99999 PR PBB SHADOW E&M-EST. PATIENT-LVL III: CPT | Mod: PBBFAC,,, | Performed by: NURSE PRACTITIONER

## 2025-05-14 NOTE — PROGRESS NOTES
CC: Annual  HPI: Pt is a 60 y.o.  female who presents for routine annual exam. New to me. Needs annual, did not think she needed to come every year anymore. Hysterectomy in 2009 for fibroids and AUB- one ovary present, no cervix. Has been getting mammograms done outside of [facility] but plans to schedule one with us this year. In process of scheduling colon ca screening.    Recent asthma issues with steroid use. Had bout of constipation and straining and now thinks she has a hemorrhoid. Painful if she touches area.     FH:   Breast cancer: mother and sister  Colon cancer: none  Ovarian cancer: none  Uterine cancer: none    HPV vaccine: na  Last pap smear: none on file, hyst 2009  History of abnormal pap smears: no    Colonoscopy: 2013 on file, in process of updating  DEXA: na  Mammogram: due  STD history: no  Birth control: hysterectomy  OB history: G1  Tobacco use: no    ROS:  GENERAL: Feeling well overall. Denies fever or chills.   SKIN: Denies rash or lesions.   HEAD: Denies head injury or headache.   NODES: Denies enlarged lymph nodes.   CHEST: Denies chest pain or shortness of breath.   CARDIOVASCULAR: Denies palpitations or left sided chest pain.   ABDOMEN: No abdominal pain, constipation, diarrhea, nausea, vomiting or rectal bleeding.   URINARY: No dysuria, hematuria, or burning on urination.  REPRODUCTIVE: See HPI.   BREASTS: Denies pain, lumps, or nipple discharge.   HEMATOLOGIC: No easy bruisability or excessive bleeding.   MUSCULOSKELETAL: Denies joint pain or swelling.   NEUROLOGIC: Denies syncope or weakness.   PSYCHIATRIC: Denies depression, anxiety or mood swings.    PE:   APPEARANCE: Well nourished, well developed, Black or  female in no acute distress.  NODES: no cervical, supraclavicular, or inguinal lymphadenopathy  BREASTS: Symmetrical, no skin changes or visible lesions. No palpable masses, nipple discharge or adenopathy bilaterally.  ABDOMEN: Soft. No tenderness or masses. No  distention. No hernias palpated. No CVA tenderness.  VULVA: No lesions. Normal external female genitalia.  URETHRAL MEATUS: Normal size and location, no lesions, no prolapse.  URETHRA: No masses, tenderness, or prolapse.  VAGINA: Moist. No lesions or lacerations noted. No abnormal discharge present. No odor present.   CERVIX: surgically absent  UTERUS: surgically absent  ADNEXA: No tenderness. No fullness or masses palpated in the adnexal regions.hemorrhoid present   ANUS PERINEUM: Normal.      Diagnosis:  1. Hemorrhoids, unspecified hemorrhoid type    2. History of hysterectomy    3. Well woman exam with routine gynecological exam        Plan:   Schedule mammogram  Update colon ca screening  Consider DXA next year  Pap no longer indicated  Declines std screening  Discussed hemorrhoids and treatment, if no improvement can refer to CRS       Patient was counseled today on the new ACS guidelines for cervical cytology screening as well as the current recommendations for breast cancer screening. She was counseled to follow up with her PCP for other routine health maintenance. Counseling session lasted approximately 10 minutes, and all her questions were answered.  For women over the age of 65, you can stop having cervical cancer screenings if you have never had abnormal cervical cells or cervical cancer, and youve had three negative Pap tests in a row. (You also can stop screening if youve had two negative Pap and HPV tests in a row in the past 10 years, with at least one test in the past 5 years.),    Follow-up with me in 1 year for routine exam    I spent a total of 20 minutes on the day of the visit.This includes face to face time and non-face to face time preparing to see the patient (eg, review of tests), obtaining and/or reviewing separately obtained history, documenting clinical information in the electronic or other health record, independently interpreting results and communicating results to the  patient/family/caregiver, or care coordinator.    As of April 1, 2021, the Cures Act has been passed nationally. This new law requires that all doctors progress notes, lab results, pathology reports and radiology reports be released IMMEDIATELY to the patient in the patient portal. That means that the results are released to you at the EXACT same time they are released to me. Therefore, with all of the patients that I have I am not able to reply to each patient exactly when the results come in. So there will be a delay from when you see the results to when I see them and have time to come up with a response to send you. Also I only see these results when I am on the computer at work. So if the results come in over the weekend or after 5 pm of a work day, I will not see them until the next business day. As you can tell, this is a challenge as a provider to give every patient the quick response they hope for and deserve. So please be patient!     Thanks for your understanding and patience.

## 2025-05-20 ENCOUNTER — HOSPITAL ENCOUNTER (OUTPATIENT)
Dept: RADIOLOGY | Facility: HOSPITAL | Age: 60
Discharge: HOME | End: 2025-05-20
Attending: NURSE PRACTITIONER
Payer: COMMERCIAL

## 2025-05-20 DIAGNOSIS — Z12.31 VISIT FOR SCREENING MAMMOGRAM: ICD-10-CM

## 2025-05-20 PROCEDURE — 77067 SCR MAMMO BI INCL CAD: CPT | Mod: TC

## 2025-05-20 PROCEDURE — 77063 BREAST TOMOSYNTHESIS BI: CPT | Mod: 26,,, | Performed by: RADIOLOGY

## 2025-05-20 PROCEDURE — 77067 SCR MAMMO BI INCL CAD: CPT | Mod: 26,,, | Performed by: RADIOLOGY

## 2025-05-21 ENCOUNTER — RESULTS FOLLOW-UP (OUTPATIENT)
Dept: OBSTETRICS AND GYNECOLOGY | Facility: CLINIC | Age: 60
End: 2025-05-21

## 2025-06-04 ENCOUNTER — OFFICE VISIT (OUTPATIENT)
Dept: OBSTETRICS AND GYNECOLOGY | Facility: CLINIC | Age: 60
End: 2025-06-04
Payer: COMMERCIAL

## 2025-06-04 VITALS
DIASTOLIC BLOOD PRESSURE: 80 MMHG | HEIGHT: 63 IN | BODY MASS INDEX: 33.36 KG/M2 | WEIGHT: 188.25 LBS | SYSTOLIC BLOOD PRESSURE: 126 MMHG

## 2025-06-04 DIAGNOSIS — N89.8 VAGINAL ITCHING: ICD-10-CM

## 2025-06-04 DIAGNOSIS — N90.7 INCLUSION CYST OF VULVA: Primary | ICD-10-CM

## 2025-06-04 PROCEDURE — 3074F SYST BP LT 130 MM HG: CPT | Mod: CPTII,S$GLB,, | Performed by: STUDENT IN AN ORGANIZED HEALTH CARE EDUCATION/TRAINING PROGRAM

## 2025-06-04 PROCEDURE — 3079F DIAST BP 80-89 MM HG: CPT | Mod: CPTII,S$GLB,, | Performed by: STUDENT IN AN ORGANIZED HEALTH CARE EDUCATION/TRAINING PROGRAM

## 2025-06-04 PROCEDURE — 1159F MED LIST DOCD IN RCRD: CPT | Mod: CPTII,S$GLB,, | Performed by: STUDENT IN AN ORGANIZED HEALTH CARE EDUCATION/TRAINING PROGRAM

## 2025-06-04 PROCEDURE — 87591 N.GONORRHOEAE DNA AMP PROB: CPT | Performed by: STUDENT IN AN ORGANIZED HEALTH CARE EDUCATION/TRAINING PROGRAM

## 2025-06-04 PROCEDURE — 81515 NFCT DS BV&VAGINITIS DNA ALG: CPT | Performed by: STUDENT IN AN ORGANIZED HEALTH CARE EDUCATION/TRAINING PROGRAM

## 2025-06-04 PROCEDURE — 99213 OFFICE O/P EST LOW 20 MIN: CPT | Mod: S$GLB,,, | Performed by: STUDENT IN AN ORGANIZED HEALTH CARE EDUCATION/TRAINING PROGRAM

## 2025-06-04 PROCEDURE — 3008F BODY MASS INDEX DOCD: CPT | Mod: CPTII,S$GLB,, | Performed by: STUDENT IN AN ORGANIZED HEALTH CARE EDUCATION/TRAINING PROGRAM

## 2025-06-04 PROCEDURE — 99999 PR PBB SHADOW E&M-EST. PATIENT-LVL III: CPT | Mod: PBBFAC,,, | Performed by: STUDENT IN AN ORGANIZED HEALTH CARE EDUCATION/TRAINING PROGRAM

## 2025-06-05 LAB
BACTERIAL VAGINOSIS DNA (OHS): NOT DETECTED
C TRACH DNA SPEC QL NAA+PROBE: NOT DETECTED
CANDIDA GLABRATA/KRUSEI DNA (OHS): NOT DETECTED
CANDIDA SPECIES DNA (OHS): NOT DETECTED
CTGC SOURCE (OHS) ORD-325: NORMAL
N GONORRHOEA DNA UR QL NAA+PROBE: NOT DETECTED
TRICHOMONAS VAGINALIS DNA (OHS): NOT DETECTED

## 2025-06-11 ENCOUNTER — TELEPHONE (OUTPATIENT)
Dept: OBSTETRICS AND GYNECOLOGY | Facility: CLINIC | Age: 60
End: 2025-06-11
Payer: COMMERCIAL

## 2025-06-12 ENCOUNTER — TELEPHONE (OUTPATIENT)
Dept: OBSTETRICS AND GYNECOLOGY | Facility: CLINIC | Age: 60
End: 2025-06-12
Payer: COMMERCIAL

## 2025-06-12 NOTE — TELEPHONE ENCOUNTER
Spoke with patient in regards to concerns. Patient states she is still experiencing itching, advised her all swabs were negative. Recommended Monistat 7 for relief. If symptoms continue let us know. Patient asked when can she return to gym or swimming, advised patient to wait at least the 2 weeks that Dr. Josue recommended after procedure to avoid any infection. PT VU  ----- Message from Nurse Palacios sent at 6/11/2025  5:28 PM CDT -----  Regarding: FW: nurse call back    ----- Message -----  From: Maris Mike  Sent: 6/11/2025  12:15 PM CDT  To: Liat STEARNS Staff  Subject: nurse call back                                  Name of Who is Calling:pt What is the request in detail:pt is asking for nurse to call back regarding the procedure she had last week. Please call to assist Can the clinic reply by [facility]:What Number to Call Back if not in [facility]: [phone]

## 2025-07-15 ENCOUNTER — OFFICE VISIT (OUTPATIENT)
Dept: OPTOMETRY | Facility: CLINIC | Age: 60
End: 2025-07-15
Payer: COMMERCIAL

## 2025-07-15 DIAGNOSIS — H57.12 PAIN OF LEFT EYE: ICD-10-CM

## 2025-07-15 DIAGNOSIS — J34.89 SINUS PRESSURE: Primary | ICD-10-CM

## 2025-07-15 PROCEDURE — 1160F RVW MEDS BY RX/DR IN RCRD: CPT | Mod: CPTII,S$GLB,, | Performed by: OPTOMETRIST

## 2025-07-15 PROCEDURE — 99999 PR PBB SHADOW E&M-EST. PATIENT-LVL II: CPT | Mod: PBBFAC,,, | Performed by: OPTOMETRIST

## 2025-07-15 PROCEDURE — 1159F MED LIST DOCD IN RCRD: CPT | Mod: CPTII,S$GLB,, | Performed by: OPTOMETRIST

## 2025-07-15 PROCEDURE — 99213 OFFICE O/P EST LOW 20 MIN: CPT | Mod: S$GLB,,, | Performed by: OPTOMETRIST

## 2025-07-15 RX ORDER — FLUOROMETHOLONE 1 MG/ML
SUSPENSION/ DROPS OPHTHALMIC
Qty: 10 ML | Refills: 0 | Status: SHIPPED | OUTPATIENT
Start: 2025-07-15 | End: 2025-08-06

## 2025-08-20 ENCOUNTER — HOSPITAL ENCOUNTER (EMERGENCY)
Facility: HOSPITAL | Age: 60
Discharge: HOME | End: 2025-08-20
Attending: STUDENT IN AN ORGANIZED HEALTH CARE EDUCATION/TRAINING PROGRAM
Payer: COMMERCIAL

## 2025-08-20 VITALS
DIASTOLIC BLOOD PRESSURE: 81 MMHG | WEIGHT: 188.25 LBS | OXYGEN SATURATION: 97 % | SYSTOLIC BLOOD PRESSURE: 130 MMHG | TEMPERATURE: 98 F | RESPIRATION RATE: 18 BRPM | HEIGHT: 63 IN | HEART RATE: 65 BPM | BODY MASS INDEX: 33.36 KG/M2

## 2025-08-20 DIAGNOSIS — R07.9 CHEST PAIN: ICD-10-CM

## 2025-08-20 DIAGNOSIS — K21.9 GASTROESOPHAGEAL REFLUX DISEASE, UNSPECIFIED WHETHER ESOPHAGITIS PRESENT: Primary | ICD-10-CM

## 2025-08-20 LAB
ABSOLUTE EOSINOPHIL (OHS): 0.08 K/UL
ABSOLUTE MONOCYTE (OHS): 0.53 K/UL (ref 0.3–1)
ABSOLUTE NEUTROPHIL COUNT (OHS): 3.87 K/UL (ref 1.8–7.7)
ALBUMIN SERPL BCP-MCNC: 4 G/DL (ref 3.5–5.2)
ALP SERPL-CCNC: 96 UNIT/L (ref 40–150)
ALT SERPL W/O P-5'-P-CCNC: 13 UNIT/L (ref 0–55)
ANION GAP (OHS): 9 MMOL/L (ref 8–16)
AST SERPL-CCNC: 21 UNIT/L (ref 0–50)
BASOPHILS # BLD AUTO: 0.03 K/UL
BASOPHILS NFR BLD AUTO: 0.4 %
BILIRUB SERPL-MCNC: 0.4 MG/DL (ref 0.1–1)
BUN SERPL-MCNC: 13 MG/DL (ref 6–20)
CALCIUM SERPL-MCNC: 10 MG/DL (ref 8.7–10.5)
CHLORIDE SERPL-SCNC: 108 MMOL/L (ref 95–110)
CO2 SERPL-SCNC: 24 MMOL/L (ref 23–29)
CREAT SERPL-MCNC: 0.9 MG/DL (ref 0.5–1.4)
ERYTHROCYTE [DISTWIDTH] IN BLOOD BY AUTOMATED COUNT: 13.3 % (ref 11.5–14.5)
GFR SERPLBLD CREATININE-BSD FMLA CKD-EPI: >60 ML/MIN/1.73/M2
GLUCOSE SERPL-MCNC: 90 MG/DL (ref 70–110)
HCT VFR BLD AUTO: 41.2 % (ref 37–48.5)
HCV AB SERPL QL IA: NORMAL
HGB BLD-MCNC: 13 GM/DL (ref 12–16)
HIV 1+2 AB+HIV1 P24 AG SERPL QL IA: NORMAL
IMM GRANULOCYTES # BLD AUTO: 0.01 K/UL (ref 0–0.04)
IMM GRANULOCYTES NFR BLD AUTO: 0.1 % (ref 0–0.5)
LYMPHOCYTES # BLD AUTO: 2.34 K/UL (ref 1–4.8)
MCH RBC QN AUTO: 26.2 PG (ref 27–31)
MCHC RBC AUTO-ENTMCNC: 31.6 G/DL (ref 32–36)
MCV RBC AUTO: 83 FL (ref 82–98)
NT-PROBNP SERPL-MCNC: 71 PG/ML
NUCLEATED RBC (/100WBC) (OHS): 0 /100 WBC
OHS QRS DURATION: 72 MS
OHS QTC CALCULATION: 424 MS
PLATELET # BLD AUTO: 284 K/UL (ref 150–450)
PMV BLD AUTO: 9.3 FL (ref 9.2–12.9)
POTASSIUM SERPL-SCNC: 3.8 MMOL/L (ref 3.5–5.1)
PROT SERPL-MCNC: 7.3 GM/DL (ref 6–8.4)
RBC # BLD AUTO: 4.96 M/UL (ref 4–5.4)
RELATIVE EOSINOPHIL (OHS): 1.2 %
RELATIVE LYMPHOCYTE (OHS): 34.1 % (ref 18–48)
RELATIVE MONOCYTE (OHS): 7.7 % (ref 4–15)
RELATIVE NEUTROPHIL (OHS): 56.5 % (ref 38–73)
SODIUM SERPL-SCNC: 141 MMOL/L (ref 136–145)
TROPONIN I SERPL HS-MCNC: <3 NG/L
WBC # BLD AUTO: 6.86 K/UL (ref 3.9–12.7)

## 2025-08-20 PROCEDURE — 86803 HEPATITIS C AB TEST: CPT | Performed by: PHYSICIAN ASSISTANT

## 2025-08-20 PROCEDURE — 99285 EMERGENCY DEPT VISIT HI MDM: CPT | Mod: 25

## 2025-08-20 PROCEDURE — 96374 THER/PROPH/DIAG INJ IV PUSH: CPT

## 2025-08-20 PROCEDURE — 80053 COMPREHEN METABOLIC PANEL: CPT | Performed by: STUDENT IN AN ORGANIZED HEALTH CARE EDUCATION/TRAINING PROGRAM

## 2025-08-20 PROCEDURE — 93010 ELECTROCARDIOGRAM REPORT: CPT | Mod: ,,, | Performed by: STUDENT IN AN ORGANIZED HEALTH CARE EDUCATION/TRAINING PROGRAM

## 2025-08-20 PROCEDURE — 63600175 PHARM REV CODE 636 W HCPCS: Performed by: STUDENT IN AN ORGANIZED HEALTH CARE EDUCATION/TRAINING PROGRAM

## 2025-08-20 PROCEDURE — 85025 COMPLETE CBC W/AUTO DIFF WBC: CPT | Performed by: STUDENT IN AN ORGANIZED HEALTH CARE EDUCATION/TRAINING PROGRAM

## 2025-08-20 PROCEDURE — 83880 ASSAY OF NATRIURETIC PEPTIDE: CPT | Performed by: STUDENT IN AN ORGANIZED HEALTH CARE EDUCATION/TRAINING PROGRAM

## 2025-08-20 PROCEDURE — 84484 ASSAY OF TROPONIN QUANT: CPT | Performed by: STUDENT IN AN ORGANIZED HEALTH CARE EDUCATION/TRAINING PROGRAM

## 2025-08-20 PROCEDURE — 25000003 PHARM REV CODE 250: Performed by: STUDENT IN AN ORGANIZED HEALTH CARE EDUCATION/TRAINING PROGRAM

## 2025-08-20 PROCEDURE — 93005 ELECTROCARDIOGRAM TRACING: CPT

## 2025-08-20 PROCEDURE — 87389 HIV-1 AG W/HIV-1&-2 AB AG IA: CPT | Performed by: PHYSICIAN ASSISTANT

## 2025-08-20 RX ORDER — ONDANSETRON HYDROCHLORIDE 2 MG/ML
4 INJECTION, SOLUTION INTRAVENOUS
Status: COMPLETED | OUTPATIENT
Start: 2025-08-20 | End: 2025-08-20

## 2025-08-20 RX ORDER — ASPIRIN 325 MG
325 TABLET ORAL
Status: COMPLETED | OUTPATIENT
Start: 2025-08-20 | End: 2025-08-20

## 2025-08-20 RX ORDER — ALUMINUM HYDROXIDE, MAGNESIUM HYDROXIDE, AND SIMETHICONE 1200; 120; 1200 MG/30ML; MG/30ML; MG/30ML
5 SUSPENSION ORAL
Status: COMPLETED | OUTPATIENT
Start: 2025-08-20 | End: 2025-08-20

## 2025-08-20 RX ADMIN — ONDANSETRON 4 MG: 2 INJECTION INTRAMUSCULAR; INTRAVENOUS at 05:08

## 2025-08-20 RX ADMIN — ALUMINUM HYDROXIDE, MAGNESIUM HYDROXIDE, AND SIMETHICONE 5 ML: 200; 200; 20 SUSPENSION ORAL at 05:08

## 2025-08-20 RX ADMIN — ASPIRIN 325 MG ORAL TABLET 325 MG: 325 PILL ORAL at 05:08

## 2025-08-23 LAB — HOLD SPECIMEN: NORMAL
